# Patient Record
Sex: MALE | Race: WHITE | NOT HISPANIC OR LATINO | Employment: FULL TIME | URBAN - METROPOLITAN AREA
[De-identification: names, ages, dates, MRNs, and addresses within clinical notes are randomized per-mention and may not be internally consistent; named-entity substitution may affect disease eponyms.]

---

## 2018-08-23 ENCOUNTER — OFFICE VISIT CONVERTED (OUTPATIENT)
Dept: INTERNAL MEDICINE | Facility: CLINIC | Age: 58
End: 2018-08-23
Attending: INTERNAL MEDICINE

## 2018-10-08 ENCOUNTER — OFFICE VISIT CONVERTED (OUTPATIENT)
Dept: SURGERY | Facility: CLINIC | Age: 58
End: 2018-10-08
Attending: SURGERY

## 2018-10-08 ENCOUNTER — CONVERSION ENCOUNTER (OUTPATIENT)
Dept: SURGERY | Facility: CLINIC | Age: 58
End: 2018-10-08

## 2018-10-16 ENCOUNTER — CONVERSION ENCOUNTER (OUTPATIENT)
Dept: INTERNAL MEDICINE | Facility: CLINIC | Age: 58
End: 2018-10-16

## 2018-10-16 ENCOUNTER — OFFICE VISIT CONVERTED (OUTPATIENT)
Dept: INTERNAL MEDICINE | Facility: CLINIC | Age: 58
End: 2018-10-16
Attending: INTERNAL MEDICINE

## 2019-01-09 ENCOUNTER — OFFICE VISIT CONVERTED (OUTPATIENT)
Dept: INTERNAL MEDICINE | Facility: CLINIC | Age: 59
End: 2019-01-09
Attending: INTERNAL MEDICINE

## 2019-01-09 ENCOUNTER — HOSPITAL ENCOUNTER (OUTPATIENT)
Dept: OTHER | Facility: HOSPITAL | Age: 59
Discharge: HOME | End: 2019-01-09
Attending: INTERNAL MEDICINE

## 2019-01-09 LAB
ALBUMIN SERPL-MCNC: 4.1 G/DL (ref 3.5–5)
ALBUMIN/GLOB SERPL: 1.4 {RATIO} (ref 1.4–2.6)
ALP SERPL-CCNC: 88 U/L (ref 56–119)
ALT SERPL-CCNC: 39 U/L (ref 10–40)
ANION GAP SERPL CALC-SCNC: 15 MMOL/L (ref 8–19)
AST SERPL-CCNC: 21 U/L (ref 15–50)
BILIRUB SERPL-MCNC: 0.37 MG/DL (ref 0.2–1.3)
BUN SERPL-MCNC: 15 MG/DL (ref 5–25)
BUN/CREAT SERPL: 13 {RATIO} (ref 6–20)
CALCIUM SERPL-MCNC: 9 MG/DL (ref 8.7–10.4)
CHLORIDE SERPL-SCNC: 104 MMOL/L (ref 99–111)
CONV CO2: 24 MMOL/L (ref 22–32)
CONV TOTAL PROTEIN: 7 G/DL (ref 6.3–8.2)
CREAT UR-MCNC: 1.15 MG/DL (ref 0.7–1.2)
GFR SERPLBLD BASED ON 1.73 SQ M-ARVRAT: >60 ML/MIN/{1.73_M2}
GLOBULIN UR ELPH-MCNC: 2.9 G/DL (ref 2–3.5)
GLUCOSE SERPL-MCNC: 104 MG/DL (ref 70–99)
OSMOLALITY SERPL CALC.SUM OF ELEC: 287 MOSM/KG (ref 273–304)
POTASSIUM SERPL-SCNC: 4.8 MMOL/L (ref 3.5–5.3)
SODIUM SERPL-SCNC: 138 MMOL/L (ref 135–147)

## 2019-02-12 ENCOUNTER — OFFICE VISIT CONVERTED (OUTPATIENT)
Dept: INTERNAL MEDICINE | Facility: CLINIC | Age: 59
End: 2019-02-12
Attending: INTERNAL MEDICINE

## 2019-02-12 ENCOUNTER — HOSPITAL ENCOUNTER (OUTPATIENT)
Dept: OTHER | Facility: HOSPITAL | Age: 59
Discharge: HOME | End: 2019-02-12
Attending: INTERNAL MEDICINE

## 2019-02-12 LAB
ANION GAP SERPL CALC-SCNC: 15 MMOL/L (ref 8–19)
BUN SERPL-MCNC: 17 MG/DL (ref 5–25)
BUN/CREAT SERPL: 14 {RATIO} (ref 6–20)
CALCIUM SERPL-MCNC: 8.9 MG/DL (ref 8.7–10.4)
CHLORIDE SERPL-SCNC: 106 MMOL/L (ref 99–111)
CONV CO2: 24 MMOL/L (ref 22–32)
CREAT UR-MCNC: 1.22 MG/DL (ref 0.7–1.2)
GFR SERPLBLD BASED ON 1.73 SQ M-ARVRAT: >60 ML/MIN/{1.73_M2}
GLUCOSE SERPL-MCNC: 160 MG/DL (ref 70–99)
OSMOLALITY SERPL CALC.SUM OF ELEC: 295 MOSM/KG (ref 273–304)
POTASSIUM SERPL-SCNC: 4.7 MMOL/L (ref 3.5–5.3)
SODIUM SERPL-SCNC: 140 MMOL/L (ref 135–147)

## 2019-03-19 ENCOUNTER — HOSPITAL ENCOUNTER (OUTPATIENT)
Dept: OTHER | Facility: HOSPITAL | Age: 59
Discharge: HOME | End: 2019-03-19
Attending: PHYSICIAN ASSISTANT

## 2019-03-19 ENCOUNTER — OFFICE VISIT CONVERTED (OUTPATIENT)
Dept: INTERNAL MEDICINE | Facility: CLINIC | Age: 59
End: 2019-03-19
Attending: PHYSICIAN ASSISTANT

## 2019-04-26 ENCOUNTER — HOSPITAL ENCOUNTER (OUTPATIENT)
Dept: OTHER | Facility: HOSPITAL | Age: 59
Discharge: HOME | End: 2019-04-26
Attending: PHYSICIAN ASSISTANT

## 2019-04-26 ENCOUNTER — OFFICE VISIT CONVERTED (OUTPATIENT)
Dept: INTERNAL MEDICINE | Facility: CLINIC | Age: 59
End: 2019-04-26
Attending: PHYSICIAN ASSISTANT

## 2019-06-18 ENCOUNTER — OFFICE VISIT CONVERTED (OUTPATIENT)
Dept: INTERNAL MEDICINE | Facility: CLINIC | Age: 59
End: 2019-06-18
Attending: INTERNAL MEDICINE

## 2019-09-30 ENCOUNTER — OFFICE VISIT CONVERTED (OUTPATIENT)
Dept: INTERNAL MEDICINE | Facility: CLINIC | Age: 59
End: 2019-09-30
Attending: INTERNAL MEDICINE

## 2019-10-01 ENCOUNTER — HOSPITAL ENCOUNTER (OUTPATIENT)
Dept: CT IMAGING | Facility: HOSPITAL | Age: 59
Discharge: HOME | End: 2019-10-01
Attending: INTERNAL MEDICINE

## 2019-10-01 LAB
CREAT BLD-MCNC: 1.2 MG/DL (ref 0.6–1.4)
GFR SERPLBLD BASED ON 1.73 SQ M-ARVRAT: >60 ML/MIN/{1.73_M2}

## 2019-10-10 ENCOUNTER — HOSPITAL ENCOUNTER (OUTPATIENT)
Dept: CARDIOLOGY | Facility: HOSPITAL | Age: 59
Discharge: HOME | End: 2019-10-10
Attending: INTERNAL MEDICINE

## 2020-03-13 ENCOUNTER — HOSPITAL ENCOUNTER (OUTPATIENT)
Dept: OTHER | Facility: HOSPITAL | Age: 60
Discharge: HOME | End: 2020-03-13
Attending: PHYSICIAN ASSISTANT

## 2020-03-13 ENCOUNTER — OFFICE VISIT CONVERTED (OUTPATIENT)
Dept: INTERNAL MEDICINE | Facility: CLINIC | Age: 60
End: 2020-03-13
Attending: PHYSICIAN ASSISTANT

## 2020-12-10 ENCOUNTER — HOSPITAL ENCOUNTER (OUTPATIENT)
Dept: OTHER | Facility: HOSPITAL | Age: 60
Discharge: HOME | End: 2020-12-10
Attending: INTERNAL MEDICINE

## 2020-12-10 ENCOUNTER — CONVERSION ENCOUNTER (OUTPATIENT)
Dept: INTERNAL MEDICINE | Facility: CLINIC | Age: 60
End: 2020-12-10

## 2020-12-10 ENCOUNTER — OFFICE VISIT CONVERTED (OUTPATIENT)
Dept: INTERNAL MEDICINE | Facility: CLINIC | Age: 60
End: 2020-12-10
Attending: INTERNAL MEDICINE

## 2020-12-11 LAB
ALBUMIN SERPL-MCNC: 4.2 G/DL (ref 3.5–5)
ALBUMIN/GLOB SERPL: 1.4 {RATIO} (ref 1.4–2.6)
ALP SERPL-CCNC: 106 U/L (ref 56–119)
ALT SERPL-CCNC: 41 U/L (ref 10–40)
ANION GAP SERPL CALC-SCNC: 18 MMOL/L (ref 8–19)
AST SERPL-CCNC: 26 U/L (ref 15–50)
BASOPHILS # BLD AUTO: 0.04 10*3/UL (ref 0–0.2)
BASOPHILS NFR BLD AUTO: 0.6 % (ref 0–3)
BILIRUB SERPL-MCNC: 0.57 MG/DL (ref 0.2–1.3)
BUN SERPL-MCNC: 13 MG/DL (ref 5–25)
BUN/CREAT SERPL: 12 {RATIO} (ref 6–20)
CALCIUM SERPL-MCNC: 9.2 MG/DL (ref 8.7–10.4)
CHLORIDE SERPL-SCNC: 104 MMOL/L (ref 99–111)
CHOLEST SERPL-MCNC: 163 MG/DL (ref 107–200)
CHOLEST/HDLC SERPL: 4.9 {RATIO} (ref 3–6)
CONV ABS IMM GRAN: 0.01 10*3/UL (ref 0–0.2)
CONV CO2: 20 MMOL/L (ref 22–32)
CONV IMMATURE GRAN: 0.1 % (ref 0–1.8)
CONV TOTAL PROTEIN: 7.3 G/DL (ref 6.3–8.2)
CREAT UR-MCNC: 1.13 MG/DL (ref 0.7–1.2)
DEPRECATED RDW RBC AUTO: 43.3 FL (ref 35.1–43.9)
EOSINOPHIL # BLD AUTO: 0.4 10*3/UL (ref 0–0.7)
EOSINOPHIL # BLD AUTO: 5.8 % (ref 0–7)
ERYTHROCYTE [DISTWIDTH] IN BLOOD BY AUTOMATED COUNT: 13.2 % (ref 11.6–14.4)
GFR SERPLBLD BASED ON 1.73 SQ M-ARVRAT: >60 ML/MIN/{1.73_M2}
GLOBULIN UR ELPH-MCNC: 3.1 G/DL (ref 2–3.5)
GLUCOSE SERPL-MCNC: 108 MG/DL (ref 70–99)
HCT VFR BLD AUTO: 47.5 % (ref 42–52)
HDLC SERPL-MCNC: 33 MG/DL (ref 40–60)
HGB BLD-MCNC: 15.5 G/DL (ref 14–18)
LDLC SERPL CALC-MCNC: 109 MG/DL (ref 70–100)
LYMPHOCYTES # BLD AUTO: 3.07 10*3/UL (ref 1–5)
LYMPHOCYTES NFR BLD AUTO: 44.5 % (ref 20–45)
MCH RBC QN AUTO: 29.4 PG (ref 27–31)
MCHC RBC AUTO-ENTMCNC: 32.6 G/DL (ref 33–37)
MCV RBC AUTO: 90 FL (ref 80–96)
MONOCYTES # BLD AUTO: 0.54 10*3/UL (ref 0.2–1.2)
MONOCYTES NFR BLD AUTO: 7.8 % (ref 3–10)
NEUTROPHILS # BLD AUTO: 2.84 10*3/UL (ref 2–8)
NEUTROPHILS NFR BLD AUTO: 41.2 % (ref 30–85)
NRBC CBCN: 0 % (ref 0–0.7)
OSMOLALITY SERPL CALC.SUM OF ELEC: 285 MOSM/KG (ref 273–304)
PLATELET # BLD AUTO: 278 10*3/UL (ref 130–400)
PMV BLD AUTO: 10.2 FL (ref 9.4–12.4)
POTASSIUM SERPL-SCNC: 4.6 MMOL/L (ref 3.5–5.3)
PSA SERPL-MCNC: 0.35 NG/ML (ref 0–4)
RBC # BLD AUTO: 5.28 10*6/UL (ref 4.7–6.1)
SODIUM SERPL-SCNC: 137 MMOL/L (ref 135–147)
TRIGL SERPL-MCNC: 105 MG/DL (ref 40–150)
TSH SERPL-ACNC: 1.73 M[IU]/L (ref 0.27–4.2)
VLDLC SERPL-MCNC: 21 MG/DL (ref 5–37)
WBC # BLD AUTO: 6.9 10*3/UL (ref 4.8–10.8)

## 2021-02-12 ENCOUNTER — OFFICE VISIT CONVERTED (OUTPATIENT)
Dept: INTERNAL MEDICINE | Facility: CLINIC | Age: 61
End: 2021-02-12
Attending: INTERNAL MEDICINE

## 2021-02-23 ENCOUNTER — OFFICE VISIT CONVERTED (OUTPATIENT)
Dept: ORTHOPEDIC SURGERY | Facility: CLINIC | Age: 61
End: 2021-02-23
Attending: ORTHOPAEDIC SURGERY

## 2021-02-26 ENCOUNTER — HOSPITAL ENCOUNTER (OUTPATIENT)
Dept: OTHER | Facility: HOSPITAL | Age: 61
Setting detail: RECURRING SERIES
Discharge: HOME | End: 2021-04-29
Attending: INTERNAL MEDICINE

## 2021-03-22 ENCOUNTER — OFFICE VISIT CONVERTED (OUTPATIENT)
Dept: ORTHOPEDIC SURGERY | Facility: CLINIC | Age: 61
End: 2021-03-22
Attending: PHYSICIAN ASSISTANT

## 2021-05-10 NOTE — H&P
History and Physical      Patient Name: Kit Gama   Patient ID: [id number]   Sex: Male   YOB: 1960    Primary Care Provider: Blu Engel MD   Referring Provider: Blu Engel MD    Visit Date: February 23, 2021    Provider: Hemant Singer MD   Location: [facility]   Location Address: [address] [place] [address]  [id number]   Location Phone: [phone]          Chief Complaint  · Right knee pain      History Of Present Illness  Kit Gama is a 60 year old /White male who presents today to [facility].      The patient presents here today for evaluation of his right knee. He states he has been having knee pain for over a year now over the anterior medial knee. He describes it as a stabbing pain. He admits to instability, clicking,  and popping of the knee. He states pain is worse with activity but it also gives him pain at night. His PCP has given him steroid injections with no relief.  He does use Voltaren gel for pain. His PCP is sending him to physical therapy.             Past Medical History  Asthma; Broken Bones; GERD (gastroesophageal reflux disease); Kidney stones; Migraine; Night sweat         Past Surgical History  Appendectomy; EYE SURGERY         Medication List  diclofenac sodium 75 mg oral tablet,delayed release (DR/EC); losartan 100 mg oral tablet; ProAir HFA 90 mcg/actuation inhalation HFA aerosol inhaler; Voltaren 1 % topical gel         Allergy List  NO KNOWN DRUG ALLERGIES       Allergies Reconciled  Social History  Alcohol (Never); Tobacco (Never)         Review of Systems  · Constitutional  o Denies  o : fever, chills, weight loss  · Cardiovascular  o Denies  o : chest pain, shortness of breath  · Gastrointestinal  o Denies  o : liver disease, heartburn, nausea, blood in stools  · Genitourinary  o Denies  o : painful urination, blood in urine  · Integument  o Denies  o : rash, itching  · Neurologic  o Denies  o :  "headache, weakness, loss of consciousness  · Musculoskeletal  o Denies  o : painful, swollen joints  · Psychiatric  o Denies  o : drug/alcohol addiction, anxiety, depression      Vitals  Date Time BP Position Site L\R Cuff Size HR RR TEMP (F) WT  HT  BMI kg/m2 BSA m2 O2 Sat FR L/min FiO2 HC       02/23/2021 02:30 PM         231lbs 5oz 5'  7\" 36.23 2.23             Physical Examination  · Constitutional  o Appearance  o : well developed, well-nourished, no obvious deformities present  · Head and Face  o Head  o :   § Inspection  § : normocephalic  o Face  o :   § Inspection  § : no facial lesions  · Eyes  o Conjunctivae  o : conjunctivae normal  o Sclerae  o : sclerae white  · Ears, Nose, Mouth and Throat  o Ears  o :   § External Ears  § : appearance within normal limits  § Hearing  § : intact  o Nose  o :   § External Nose  § : appearance normal  · Neck  o Inspection/Palpation  o : normal appearance  o Range of Motion  o : full range of motion  · Respiratory  o Respiratory Effort  o : breathing unlabored  o Inspection of Chest  o : normal appearance  o Auscultation of Lungs  o : no audible wheezing or rales  · Cardiovascular  o Heart  o : regular rate  · Gastrointestinal  o Abdominal Examination  o : soft and non-tender  · Skin and Subcutaneous Tissue  o General Inspection  o : intact, no rashes  · Psychiatric  o General  o : Alert and oriented x3  o Judgement and Insight  o : judgment and insight intact  o Mood and Affect  o : mood normal, affect appropriate  · Right Knee  o Inspection  o : Non tender. No skin discoloration, atrophy, or swelling. -5 Extension. Flexion 120. Stable to varus and valgus stress. Stable to anterior and posterior drawer. Negative Eduardo's. Crepitus with ROM. Neurovascularly intact.  · In Office Procedures  o View  o : LAT/SUNRISE/STANDING  o Site  o : right, knee  o Indication  o : right knee pain  o Study  o : X-rays ordered, taken in the office, and reviewed today.  o Xray  o : " Advanced degenerative changes with bone on bone medial compartment changes, mild varus deformity. Small foreign body in the knee.   o Comparative Data  o : No comparative data found          Assessment  · Primary osteoarthritis of right knee     715.16/M17.11  · Right knee pain, unspecified chronicity     719.46/M25.561      Plan  · Orders  o Knee (Right) [facility] Preferred View ([id number]) - 719.46/M25.561 - 02/23/2021  · Medications  o Transition of Care or Provider Policy  · Instructions  o Reviewed the patient's Past Medical, Social, and Family history as well as the ROS at today's visit, no changes.  o Call or return if worsening symptoms.  o The above service was scribed by Grisel Bustillo on my behalf and I attest to the accuracy of the note. jsb  o Discussed the treatment options with the patient. The patient requested to hold off on surgery as long as he can. We will try to get an approval for Visco supplementation or Zilretta injections. Normal knee brace given today.   o Electronically Identified Patient Education Materials Provided Electronically            Electronically Signed by: Grisel Bustillo MA -Author on February 24, 2021 09:13:02 AM  Electronically Co-signed by: Hemant Singer MD -Reviewer on February 24, 2021 08:25:10 PM

## 2021-05-13 ENCOUNTER — OFFICE VISIT CONVERTED (OUTPATIENT)
Dept: INTERNAL MEDICINE | Facility: CLINIC | Age: 61
End: 2021-05-13
Attending: INTERNAL MEDICINE

## 2021-05-13 ENCOUNTER — HOSPITAL ENCOUNTER (OUTPATIENT)
Dept: INTERNAL MEDICINE | Facility: CLINIC | Age: 61
Discharge: HOME | End: 2021-05-13
Attending: INTERNAL MEDICINE

## 2021-05-13 ENCOUNTER — OFFICE VISIT CONVERTED (OUTPATIENT)
Dept: ORTHOPEDIC SURGERY | Facility: CLINIC | Age: 61
End: 2021-05-13
Attending: PHYSICIAN ASSISTANT

## 2021-05-13 LAB
ALBUMIN SERPL-MCNC: 4.1 G/DL (ref 3.5–5)
ALBUMIN/GLOB SERPL: 1.3 {RATIO} (ref 1.4–2.6)
ALP SERPL-CCNC: 96 U/L (ref 56–119)
ALT SERPL-CCNC: 41 U/L (ref 10–40)
ANION GAP SERPL CALC-SCNC: 16 MMOL/L (ref 8–19)
AST SERPL-CCNC: 26 U/L (ref 15–50)
BASOPHILS # BLD AUTO: 0.06 10*3/UL (ref 0–0.2)
BASOPHILS NFR BLD AUTO: 0.8 % (ref 0–3)
BILIRUB SERPL-MCNC: 0.27 MG/DL (ref 0.2–1.3)
BUN SERPL-MCNC: 19 MG/DL (ref 5–25)
BUN/CREAT SERPL: 15 {RATIO} (ref 6–20)
CALCIUM SERPL-MCNC: 9 MG/DL (ref 8.7–10.4)
CHLORIDE SERPL-SCNC: 106 MMOL/L (ref 99–111)
CHOLEST SERPL-MCNC: 160 MG/DL (ref 107–200)
CHOLEST/HDLC SERPL: 5 {RATIO} (ref 3–6)
CONV ABS IMM GRAN: 0.03 10*3/UL (ref 0–0.2)
CONV CO2: 22 MMOL/L (ref 22–32)
CONV IMMATURE GRAN: 0.4 % (ref 0–1.8)
CONV TOTAL PROTEIN: 7.2 G/DL (ref 6.3–8.2)
CREAT UR-MCNC: 1.25 MG/DL (ref 0.7–1.2)
DEPRECATED RDW RBC AUTO: 42.7 FL (ref 35.1–43.9)
EOSINOPHIL # BLD AUTO: 0.4 10*3/UL (ref 0–0.7)
EOSINOPHIL # BLD AUTO: 5.3 % (ref 0–7)
ERYTHROCYTE [DISTWIDTH] IN BLOOD BY AUTOMATED COUNT: 13.1 % (ref 11.6–14.4)
GFR SERPLBLD BASED ON 1.73 SQ M-ARVRAT: >60 ML/MIN/{1.73_M2}
GLOBULIN UR ELPH-MCNC: 3.1 G/DL (ref 2–3.5)
GLUCOSE SERPL-MCNC: 114 MG/DL (ref 70–99)
HCT VFR BLD AUTO: 45.5 % (ref 42–52)
HDLC SERPL-MCNC: 32 MG/DL (ref 40–60)
HGB BLD-MCNC: 14.6 G/DL (ref 14–18)
LDLC SERPL CALC-MCNC: 99 MG/DL (ref 70–100)
LYMPHOCYTES # BLD AUTO: 2.89 10*3/UL (ref 1–5)
LYMPHOCYTES NFR BLD AUTO: 38.6 % (ref 20–45)
MCH RBC QN AUTO: 28.9 PG (ref 27–31)
MCHC RBC AUTO-ENTMCNC: 32.1 G/DL (ref 33–37)
MCV RBC AUTO: 90.1 FL (ref 80–96)
MONOCYTES # BLD AUTO: 0.74 10*3/UL (ref 0.2–1.2)
MONOCYTES NFR BLD AUTO: 9.9 % (ref 3–10)
NEUTROPHILS # BLD AUTO: 3.37 10*3/UL (ref 2–8)
NEUTROPHILS NFR BLD AUTO: 45 % (ref 30–85)
NRBC CBCN: 0 % (ref 0–0.7)
OSMOLALITY SERPL CALC.SUM OF ELEC: 291 MOSM/KG (ref 273–304)
PLATELET # BLD AUTO: 301 10*3/UL (ref 130–400)
PMV BLD AUTO: 10.6 FL (ref 9.4–12.4)
POTASSIUM SERPL-SCNC: 5.2 MMOL/L (ref 3.5–5.3)
RBC # BLD AUTO: 5.05 10*6/UL (ref 4.7–6.1)
SODIUM SERPL-SCNC: 139 MMOL/L (ref 135–147)
TRIGL SERPL-MCNC: 144 MG/DL (ref 40–150)
TSH SERPL-ACNC: 1.72 M[IU]/L (ref 0.27–4.2)
VLDLC SERPL-MCNC: 29 MG/DL (ref 5–37)
WBC # BLD AUTO: 7.49 10*3/UL (ref 4.8–10.8)

## 2021-05-13 NOTE — PROGRESS NOTES
Progress Note      Patient Name: Kit Gama   Patient ID: [id number]   Sex: Male   YOB: 1960    Primary Care Provider: Blu Engel MD   Referring Provider: Blu Engel MD    Visit Date: December 10, 2020    Provider: Blu Engel MD   Location: [facility]   Location Address: [address] [place] [address]  [id number]   Location Phone: [phone]          Chief Complaint  · Right knee pain  · annual exam      History Of Present Illness  Kit Gama is a 59 year old /White male who presents for evaluation and treatment of:      pt reports pain in right knee as well as intermittent edema. pt also reports dec ROM. pt previously had steroid injection that helped tremendously. pt denies erythema.   HTN- pt reports home BP readings typically normal. pt reports inc stressors wt work recently. pt reports working several hours. pt denies HAs, CP.   UTD with vaccines and Cscope  had flu shot  due for PSA       Past Medical History  Disease Name Date Onset Notes   Asthma --  --    Broken Bones --  --    GERD (gastroesophageal reflux disease) --  --    Kidney stones --  --    Migraine --  --    Night sweat --  --          Past Surgical History  Procedure Name Date Notes   Appendectomy --  --    EYE SURGERY --  --          Medication List  Name Date Started Instructions   diclofenac sodium 75 mg oral tablet,delayed release (DR/EC) 12/10/2020 take 1 tablet by oral route 2 times a day for 90 days prn pain   losartan 50 mg oral tablet 12/10/2020 TAKE 1 TABLET(50 MG) BY MOUTH EVERY DAY for 90 days   ProAir HFA 90 mcg/actuation inhalation HFA aerosol inhaler 06/18/2019 inhale 2 puffs by inhalation route every 4 hours prn cough         Allergy List  Allergen Name Date Reaction Notes   NO KNOWN DRUG ALLERGIES --  --  --        Allergies Reconciled  Social History  Finding Status Start/Stop Quantity Notes   Alcohol Never --/-- --  --    Tobacco Never  "--/-- --  --          Immunizations  NameDate Admin Mfg Trade Name Lot Number Route Inj VIS Given VIS Publication   Hepatitis A06/18/2019 SKB HAVRIX-ADULT [id number] IM LD 06/18/2019    Comments: tolerated well   Hepatitis A08/23/2018 SKB HAVRIX-ADULT [id number] IM LD 08/23/2018 07/20/2016   Comments: pt tolerateed well, left office in stable condition   Nudexjtje09/24/2019 NE Not Entered [id number] NE NE 09/30/2019    Comments:          Review of Systems  · Constitutional  o Denies  o : fever, fatigue, weight loss, weight gain  · HENT  o Denies  o : headaches, lightheadedness  · Cardiovascular  o Denies  o : lower extremity edema, chest pressure, palpitations  · Respiratory  o Denies  o : shortness of breath, wheezing, frequent cough, dyspnea on exertion  · Gastrointestinal  o Denies  o : nausea, vomiting, diarrhea, constipation, abdominal pain      Vitals  Date Time BP Position Site L\R Cuff Size HR RR TEMP (F) WT  HT  BMI kg/m2 BSA m2 O2 Sat FR L/min FiO2 HC       09/30/2019 08:31 /76 Sitting    75 - R  97.4 241lbs 2oz 5'  7\" 37.77 2.27 97 %      03/13/2020 08:18 /90 Sitting    94 - R 15 97.8 226lbs 0oz 5'  7\" 35.4 2.2 97 %      12/10/2020 11:02 /80 Sitting    72 - R  97.7 232lbs 6oz 5'  7\" 36.39 2.23 98 %            Physical Examination  · Constitutional  o Appearance  o : no acute distress, well-nourished  · Head and Face  o Head  o :   § Inspection  § : atraumatic, normocephalic  · Eyes  o Eyes  o : extraocular movements intact, no scleral icterus, no conjunctival injection  · Ears, Nose, Mouth and Throat  o Ears  o :   § External Ears  § : normal  o Nose  o :   § Intranasal Exam  § : nares patent  o Oral Cavity  o :   § Oral Mucosa  § : moist mucous membranes  · Respiratory  o Respiratory Effort  o : breathing comfortably, symmetric chest rise  o Auscultation of Lungs  o : clear to asculatation bilaterally, no wheezes, rales, or rhonchii  · Cardiovascular  o Heart  o :   § Auscultation of Heart  § : " regular rate and rhythm, no murmurs, rubs, or gallops  o Peripheral Vascular System  o :   § Extremities  § : no edema  · Neurologic  o Mental Status Examination  o :   § Orientation  § : grossly oriented to person, place and time  o Gait and Station  o :   § Gait Screening  § : normal gait  · Psychiatric  o General  o : normal mood and affect  · IMP Knee Injection  o Procedure info  o : Informed Consent obtained. Patient was informed of possible adverse effects including but not limited to bleeding, damage to nerve, tendon or artery, increased blood sugar, and increased blood pressure. They were instructed to call if they have any conerns or questions. Time out performed. Patient placed with right knee in flexion at 90 degrees. Site marked inferior and lateral to patella. Betadine was swabbed at injection site per typical technique. 25 ga, 1.5 inch needle injected into bursa, aspiration was performed and then 80mg Kenalog and 1mL 1% Lidocaine without Epi was slowly injected into joint space, fluid was free flowing. Needle was removed, betadine wiped off and band-aid placed to injection site.           Assessment  · Screening for depression     V79.0/Z13.89  · Screening for lipid disorders     V77.91/Z13.220  · Screening for prostate cancer     V76.44/Z12.5  · Annual physical exam     V70.0/Z00.00  · Essential hypertension     401.9/I10  cont close monitoring at home. pt understands goal BP <130/80. if consistently higher readings, may double losartan. f/u in 2-3 months for repeat eval.   · Right knee pain     719.46/M25.561  intraarticular steroid injection today. pt tolerated well and left in good condition. will Rx voltaren gel to use and hopefully defer oral NSAID use some.    Problems Reconciled  Plan  · Orders  o ACO-18: Negative screen for clinical depression using a standardized tool () - V79.0/Z13.89 - 12/10/2020  o Male Physical Primary Care Panel (CMP, CBC, TSH, Lipid, PSA) [facility] (54180, 15331, 37789,  78136, 79322, ) - V70.0/Z00.00, V76.44/Z12.5, V77.91/Z13.220 - 12/10/2020  o Joint Injection; major joint or bursa (eg. shoulder, hip, knee, subacromial bursa) (49159) - 719.46/M25.561 - 12/10/2020  o ACO-39: Current medications updated and reviewed (, 1159F) - - 12/10/2020  o ACO-18: Negative screen for clinical depression using a standardized tool () - - 12/10/2020  o ACO-14: Influenza immunization was not administered for reasons documented [facility] () - - 12/10/2020  o ACO-19: Colorectal cancer screening results documented and reviewed (3017F) - - 12/10/2020  o 2 - Kenalog Injection 40mg (-1) - 719.46/M25.561 - 12/10/2020   Injection - Kenalog 40 mg; Dose: 80(2mL); Site: Right Knee; Route: intra-articular; Date: 12/10/2020 13:22:48; Exp: 10/01/2021; Lot: [id number]; Mfg: TEVA PHARM; TradeName: Kenalog; Location[facility]; Administered By: Leeanne Palmer MA; Comment: Pt tolerated well.   Pt given intra-articular injection to right knee. Pt tolerated well, left the office in stable condition. 2mL of kenalog mixed with 1 ml of lidocaine given by MD Giuseppe and drawn MD Giuseppe.   · Medications  o Voltaren 1 % topical gel   SIG: apply 2 grams to the affected area(s) by topical route 4 times per day   DISP: (1) Tube with 3 refills  Prescribed on 12/10/2020     o diclofenac sodium 75 mg oral tablet,delayed release (DR/EC)   SIG: take 1 tablet by oral route 2 times a day for 90 days prn pain   DISP: (180) Tablet with 1 refills  Adjusted on 12/10/2020     o losartan 50 mg oral tablet   SIG: TAKE 1 TABLET(50 MG) BY MOUTH EVERY DAY for 90 days   DISP: (90) Tablet with 3 refills  Refilled on 12/10/2020     o Medications have been Reconciled  o Transition of Care or Provider Policy  · Instructions  o Depression Screen completed and scanned into the EMR under the designated folder within the patient's documents.  o Today's PHQ-9 result is _0__  o Reviewed health maintenance  flowsheet and updated information. Orders were placed and/or patient's response was documented.  o Patient was educated/instructed on their diagnosis, treatment and medications prior to discharge from the clinic today.  · Disposition  o f/u in 1 month            Electronically Signed by: Blu Engel MD -Author on December 10, 2020 01:32:51 PM

## 2021-05-14 VITALS — WEIGHT: 236.12 LBS | BODY MASS INDEX: 37.06 KG/M2 | HEART RATE: 83 BPM | OXYGEN SATURATION: 98 % | HEIGHT: 67 IN

## 2021-05-14 VITALS
WEIGHT: 232.37 LBS | OXYGEN SATURATION: 98 % | HEIGHT: 67 IN | DIASTOLIC BLOOD PRESSURE: 80 MMHG | BODY MASS INDEX: 36.47 KG/M2 | SYSTOLIC BLOOD PRESSURE: 172 MMHG | HEART RATE: 72 BPM | TEMPERATURE: 97.7 F

## 2021-05-14 VITALS
BODY MASS INDEX: 36.26 KG/M2 | HEART RATE: 97 BPM | HEIGHT: 67 IN | SYSTOLIC BLOOD PRESSURE: 138 MMHG | OXYGEN SATURATION: 78 % | DIASTOLIC BLOOD PRESSURE: 80 MMHG | TEMPERATURE: 97.3 F | WEIGHT: 231 LBS

## 2021-05-14 VITALS — BODY MASS INDEX: 36.3 KG/M2 | WEIGHT: 231.31 LBS | HEIGHT: 67 IN

## 2021-05-14 LAB
EST. AVERAGE GLUCOSE BLD GHB EST-MCNC: 126 MG/DL
HBA1C MFR BLD: 6 % (ref 3.5–5.7)

## 2021-05-14 NOTE — PROGRESS NOTES
"   Progress Note      Patient Name: Kit Gama   Patient ID: [id number]   Sex: Male   YOB: 1960    Primary Care Provider: Blu Engel MD   Referring Provider: Blu Engel MD    Visit Date: February 12, 2021    Provider: Blu Engel MD   Location: [facility]   Location Address: [address] [place] [address]  [id number]   Location Phone: [phone]          Chief Complaint  · Right knee pain      History Of Present Illness  Kit Gama is a 60 year old /White male who presents for evaluation and treatment of:      HTN and right knee pain.    HTN: at home, runs 130s over high 70s.  Has been taking two 50 mg of losartan daily in the interim.  Missed one dose of medicine recently but otherwise compliant.  Asking for refills today.  He denies CP or headache.    Arthritis: taking PO diclofenac daily, and frequently mixing with advil.  Has been getting more stiff and having more joint pain during this cold weather season.  Having problems with both hands, right hip and right hip.  Never received voltaren prescription.  Using OTC topical medicine.  Reports that previous joint injection alleviated symptoms for only 1 week in his right knee.  Reports Tylenol makes him drowsy, and prefers not to use it.  Has had some close calls with near falls due to knee giving out.       Allergy List    Allergies Reconciled  Vitals  Date Time BP Position Site L\R Cuff Size HR RR TEMP (F) WT  HT  BMI kg/m2 BSA m2 O2 Sat FR L/min FiO2 HC       09/30/2019 08:31 /76 Sitting    75 - R  97.4 241lbs 2oz 5'  7\" 37.77 2.27 97 %      03/13/2020 08:18 /90 Sitting    94 - R 15 97.8 226lbs 0oz 5'  7\" 35.4 2.2 97 %      12/10/2020 11:02 /80 Sitting    72 - R  97.7 232lbs 6oz 5'  7\" 36.39 2.23 98 %      02/12/2021 04:35 /80 Sitting    97 - R  97.3 231lbs 0oz 5'  7\" 36.18 2.23 78 %  21%          Physical " Examination  · Constitutional  o Appearance  o : no acute distress, well-nourished  · Head and Face  o Head  o :   § Inspection  § : atraumatic, normocephalic  · Eyes  o Eyes  o : extraocular movements intact, no scleral icterus, no conjunctival injection  · Ears, Nose, Mouth and Throat  o Ears  o :   § External Ears  § : normal  o Nose  o :   § Intranasal Exam  § : nares patent  o Oral Cavity  o :   § Oral Mucosa  § : moist mucous membranes  · Respiratory  o Respiratory Effort  o : breathing comfortably, symmetric chest rise  o Auscultation of Lungs  o : clear to asculatation bilaterally, no wheezes, rales, or rhonchii  · Cardiovascular  o Heart  o :   § Auscultation of Heart  § : regular rate and rhythm, no murmurs, rubs, or gallops  o Peripheral Vascular System  o :   § Extremities  § : no edema  · Neurologic  o Mental Status Examination  o :   § Orientation  § : grossly oriented to person, place and time  o Gait and Station  o :   § Gait Screening  § : normal gait  · Psychiatric  o General  o : normal mood and affect     Musculoskeletal:    mild effusion noted right knee lateral to patella.  No warm, no erythema.  No effusion noted left knee.    Neuro: 1+ patellar reflexes bilaterally           Assessment  · Right knee pain     719.46/M25.561  -will trial voltaren  -counseled to avoid excessive use of as well as mixing of PO NSAIDs  -will refer for PT and Ortho  -will F/U in 3-4 months  · Hypertension     401.9/I10  -BP improved today and near goal of 130/80 on increased losartan  -BP readings at home 130s/high 70s  -will continue current regimen (have switched rx from 50 to 100 mg tablets as was taking two tabs)  -bs 108 noted on recent labs; will obtain A1c at next clinic appointment    Problems Reconciled  Plan  · Orders  o ACO-39: Current medications updated and reviewed (1159F, ) - - 02/12/2021  o PHYSICAL THERAPY CONSULTATION (PHYTH) - 719.46/M25.561 - 02/12/2021  o ORTHOPEDIC CONSULTATION (ORTHO) -  719.46/M25.561 - 2021  · Medications  o losartan 100 mg oral tablet   SI tablet by mouth daily   DISP: (90) Tablet with 3 refills  Adjusted on 2021     o diclofenac sodium 75 mg oral tablet,delayed release (DR/EC)   SIG: take 1 tablet by oral route 2 times a day for 90 days prn pain   DISP: (180) Tablet with 1 refills  Refilled on 2021     o Voltaren 1 % topical gel   SIG: apply 2 grams to the affected area(s) by topical route 4 times per day   DISP: (1) Tube with 3 refills  Refilled on 2021     o Medications have been Reconciled  o Transition of Care or Provider Policy  · Instructions  o Patient was educated/instructed on their diagnosis, treatment and medications prior to discharge from the clinic today.  · Disposition  o Care Transition  o KELSEY Sent  o Return Visit Request in/on 3 months +/- 2 days (77324).            Electronically Signed by: Blu Engel MD -Author on February 15, 2021 10:10:26 AM

## 2021-05-14 NOTE — PROGRESS NOTES
Progress Note      Patient Name: Kit Gama   Patient ID: [id number]   Sex: Male   YOB: 1960    Primary Care Provider: Blu Engel MD   Referring Provider: Blu Engel MD    Visit Date: March 22, 2021    Provider: Lamont Logan PA-C   Location: [facility]   Location Address: [address] [place] [address]  [address]   Location Phone: [phone]          Chief Complaint  · Right knee pain      History Of Present Illness  Kit Gama is a 60 year old /White male who presents today to [facility]. Patient presents for evaluation of right knee pain, right knee osteoarthritis and to receive previous approved right knee Zilretta injection. Patient was seen by Dr. Singer at last visit, he has been taking diclofenac prescribed by his primary care physician, he has tried Voltaren gel with mild relief. Patient uses a knee brace. Patient points anterior medial knee as his area of worst pain. Patient has been attending physical therapy 2 times per week for the last 3 weeks, he states this is not helping his pain and increasing his strength.       Past Medical History  Arthritis; Asthma; Broken Bones; GERD (gastroesophageal reflux disease); Hypertension; Kidney stones; Migraine; Night sweat         Past Surgical History  Appendectomy; Back; EYE SURGERY         Medication List  diclofenac sodium 75 mg oral tablet,delayed release (DR/EC); losartan 100 mg oral tablet; ProAir HFA 90 mcg/actuation inhalation HFA aerosol inhaler; Voltaren 1 % topical gel         Allergy List  NO KNOWN DRUG ALLERGIES; NO KNOWN DRUG ALLERGIES       Allergies Reconciled  Family Medical History  Stroke; Cancer, Unspecified; Diabetes, unspecified type; Family history of Arthritis         Social History  Alcohol (Never); Alcohol Use (Never); lives with spouse; .; Recreational Drug Use (Never); Tobacco (Never); Working         Immunizations  Name Date  "Admin   Hepatitis A 06/18/2019   Hepatitis A 08/23/2018   Influenza 09/24/2019   Influenza 06/18/2019         Review of Systems  · Constitutional  o Denies  o : fever, chills, weight loss  · Cardiovascular  o Denies  o : chest pain, shortness of breath  · Gastrointestinal  o Denies  o : liver disease, heartburn, nausea, blood in stools  · Genitourinary  o Denies  o : painful urination, blood in urine  · Integument  o Denies  o : rash, itching  · Neurologic  o Denies  o : headache, weakness, loss of consciousness  · Musculoskeletal  o Denies  o : painful, swollen joints  · Psychiatric  o Denies  o : drug/alcohol addiction, anxiety, depression      Vitals  Date Time BP Position Site L\R Cuff Size HR RR TEMP (F) WT  HT  BMI kg/m2 BSA m2 O2 Sat FR L/min FiO2 HC       03/22/2021 08:43 AM      83 - R   236lbs 2oz 5'  7\" 36.98 2.25 98 %            Physical Examination  · Constitutional  o Appearance  o : well developed, well-nourished, no obvious deformities present  · Head and Face  o Head  o :   § Inspection  § : normocephalic  o Face  o :   § Inspection  § : no facial lesions  · Eyes  o Conjunctivae  o : conjunctivae normal  o Sclerae  o : sclerae white  · Ears, Nose, Mouth and Throat  o Ears  o :   § External Ears  § : appearance within normal limits  § Hearing  § : intact  o Nose  o :   § External Nose  § : appearance normal  · Neck  o Inspection/Palpation  o : normal appearance  o Range of Motion  o : full range of motion  · Respiratory  o Respiratory Effort  o : breathing unlabored  o Inspection of Chest  o : normal appearance  o Auscultation of Lungs  o : no audible wheezing or rales  · Cardiovascular  o Heart  o : regular rate  · Gastrointestinal  o Abdominal Examination  o : soft and non-tender  · Skin and Subcutaneous Tissue  o General Inspection  o : intact, no rashes  · Psychiatric  o General  o : Alert and oriented x3  o Judgement and Insight  o : judgment and insight intact  o Mood and Affect  o : mood " normal, affect appropriate  · Right Knee  o Inspection  o : Skin is intact, no erythema, no ecchymosis, no swelling, nontender to palpation. Tension -5, flexion 120, stable to varus/valgus stress, stable anterior/posterior drawer, negative Eduardo's, crepitus with range of motion, neurovascularly intact.  · Injection Note/Aspiration Note  o Site  o : right knee  o Procedure  o : Procedure: After educating the patient, patient gave consent for procedure. After using Chloraprep, the joint space was injected. The patient tolerated the procedure well.  o Medication  o : 32 mg Zilretta with 5cc of 1% Lidocaine  · Imaging  o Imaging  o : View : LAT/SUNRISE/STANDING Site : right, knee Indication : right knee pain Study : X-rays ordered, taken in the office, and reviewed today. Xray : Advanced degenerative changes with bone on bone medial compartment changes, mild varus deformity. Small foreign body in the knee. Comparative Data : No comparative data found           Assessment  · Primary osteoarthritis of right knee     715.16/M17.11  · Right knee pain, unspecified chronicity     719.46/M25.561      Plan  · Orders  o Zilretta- 1 mg. () () - 715.16/M17.11 - 03/22/2021   Lot [id number] Exp 08 2021 Flexion Administered by FLACO FRANKLIN PA-C  o Knee Intra-articular Injection without US Guidance [facility] ([id number]) - 715.16/M17.11 - 03/22/2021   Lot [id number] Exp 03 2022 Hospira Administered by FLACO FRANKLIN PA-C  · Medications  o Medications have been Reconciled  o Transition of Care or Provider Policy  · Instructions  o Reviewed the patient's Past Medical, Social, and Family history as well as the ROS at today's visit, no changes.  o Call or return if worsening symptoms.  o Follow up in 6-8 weeks.  o Discussed diagnosis and treatment plan with the patient, we discussed the patient can receive right knee Zilretta injection today he elected to have this and tolerated it well. Continue physical therapy, continue diclofenac,  follow-up in 6 to 8 weeks for reevaluation            Electronically Signed by: Lamont Logan PA-C -Author on March 22, 2021 11:48:09 AM  Electronically Co-signed by: Hemant Singer MD -Reviewer on March 22, 2021 04:25:40 PM

## 2021-05-15 VITALS
HEIGHT: 67 IN | TEMPERATURE: 97.4 F | DIASTOLIC BLOOD PRESSURE: 76 MMHG | SYSTOLIC BLOOD PRESSURE: 148 MMHG | WEIGHT: 241.12 LBS | OXYGEN SATURATION: 97 % | BODY MASS INDEX: 37.84 KG/M2 | HEART RATE: 75 BPM

## 2021-05-15 VITALS
OXYGEN SATURATION: 97 % | HEART RATE: 94 BPM | TEMPERATURE: 97.8 F | SYSTOLIC BLOOD PRESSURE: 134 MMHG | BODY MASS INDEX: 35.47 KG/M2 | DIASTOLIC BLOOD PRESSURE: 90 MMHG | RESPIRATION RATE: 15 BRPM | HEIGHT: 67 IN | WEIGHT: 226 LBS

## 2021-05-15 VITALS
BODY MASS INDEX: 36.73 KG/M2 | HEIGHT: 67 IN | DIASTOLIC BLOOD PRESSURE: 82 MMHG | WEIGHT: 234 LBS | OXYGEN SATURATION: 97 % | TEMPERATURE: 98.7 F | HEART RATE: 88 BPM | SYSTOLIC BLOOD PRESSURE: 134 MMHG

## 2021-05-15 VITALS
OXYGEN SATURATION: 97 % | HEART RATE: 79 BPM | DIASTOLIC BLOOD PRESSURE: 78 MMHG | TEMPERATURE: 97.9 F | SYSTOLIC BLOOD PRESSURE: 134 MMHG | BODY MASS INDEX: 36.79 KG/M2 | HEIGHT: 67 IN | WEIGHT: 234.37 LBS

## 2021-05-15 VITALS
WEIGHT: 241.25 LBS | HEIGHT: 67 IN | BODY MASS INDEX: 37.87 KG/M2 | OXYGEN SATURATION: 98 % | TEMPERATURE: 97.8 F | SYSTOLIC BLOOD PRESSURE: 138 MMHG | DIASTOLIC BLOOD PRESSURE: 84 MMHG | HEART RATE: 76 BPM

## 2021-05-16 VITALS
HEART RATE: 77 BPM | BODY MASS INDEX: 35.94 KG/M2 | RESPIRATION RATE: 22 BRPM | DIASTOLIC BLOOD PRESSURE: 80 MMHG | HEIGHT: 67 IN | SYSTOLIC BLOOD PRESSURE: 146 MMHG | WEIGHT: 229 LBS | TEMPERATURE: 97.3 F | OXYGEN SATURATION: 98 %

## 2021-05-16 VITALS — RESPIRATION RATE: 14 BRPM | BODY MASS INDEX: 35.98 KG/M2 | WEIGHT: 229.25 LBS | HEIGHT: 67 IN

## 2021-05-16 VITALS
OXYGEN SATURATION: 98 % | WEIGHT: 235 LBS | SYSTOLIC BLOOD PRESSURE: 148 MMHG | BODY MASS INDEX: 36.88 KG/M2 | TEMPERATURE: 97.8 F | DIASTOLIC BLOOD PRESSURE: 86 MMHG | HEIGHT: 67 IN | HEART RATE: 66 BPM

## 2021-05-16 VITALS
HEIGHT: 67 IN | RESPIRATION RATE: 12 BRPM | DIASTOLIC BLOOD PRESSURE: 66 MMHG | SYSTOLIC BLOOD PRESSURE: 132 MMHG | OXYGEN SATURATION: 97 % | BODY MASS INDEX: 35.47 KG/M2 | TEMPERATURE: 97 F | HEART RATE: 67 BPM | WEIGHT: 226 LBS

## 2021-05-16 VITALS
OXYGEN SATURATION: 98 % | WEIGHT: 239.5 LBS | HEART RATE: 62 BPM | TEMPERATURE: 97.6 F | SYSTOLIC BLOOD PRESSURE: 122 MMHG | DIASTOLIC BLOOD PRESSURE: 80 MMHG | HEIGHT: 67 IN | BODY MASS INDEX: 37.59 KG/M2

## 2021-06-05 NOTE — PROGRESS NOTES
Progress Note      Patient Name: Kit Gama   Patient ID: [id number]   Sex: Male   YOB: 1960    Primary Care Provider: Blu Engel MD   Referring Provider: Blu Engel MD    Visit Date: May 13, 2021    Provider: Lamont Logan PA-C   Location: [facility]   Location Address: [address] [place] [address]  [id number]   Location Phone: [phone]          Chief Complaint  · Right knee pain      History Of Present Illness  Kit Gama is a 60 year old /White male who presents today to [facility]. Patient presents for follow-up evaluation right knee pain, right knee osteoarthritis, he received Zilretta injection back on 3/22/2021 he states the injection helped his knee, he states is better than the regular steroid shot she has had in the past, he is a knee brace still, patient states his worst pain is going up and down stairs, going from sitting to standing or in and out of cars. Patient stopped physical therapy but he has been doing home exercises for the last week. No new complaints today.       Past Medical History  Arthritis; Asthma; Broken Bones; GERD (gastroesophageal reflux disease); Hypertension; Kidney Stones; Migraine; Night sweat         Past Surgical History  Appendectomy; Back; EYE SURGERY         Medication List  diclofenac sodium 75 mg oral tablet,delayed release (DR/EC); losartan 100 mg oral tablet; ProAir HFA 90 mcg/actuation inhalation HFA aerosol inhaler; Voltaren 1 % topical gel         Allergy List  NO KNOWN DRUG ALLERGIES       Allergies Reconciled  Family Medical History  Stroke; Cancer, Unspecified; Diabetes, unspecified type; Family history of Arthritis         Social History  Alcohol (Never); Alcohol Use (Never); lives with spouse; .; Recreational Drug Use (Never); Tobacco (Never); Working         Immunizations  Name Date Admin   Hepatitis A 06/18/2019   Hepatitis A 08/23/2018   Influenza 09/24/2019   Influenza  "06/18/2019         Review of Systems  · Constitutional  o Denies  o : fever, chills, weight loss  · Cardiovascular  o Denies  o : chest pain, shortness of breath  · Gastrointestinal  o Denies  o : liver disease, heartburn, nausea, blood in stools  · Genitourinary  o Denies  o : painful urination, blood in urine  · Integument  o Denies  o : rash, itching  · Neurologic  o Denies  o : headache, weakness, loss of consciousness  · Musculoskeletal  o Denies  o : painful, swollen joints  · Psychiatric  o Denies  o : drug/alcohol addiction, anxiety, depression      Vitals  Date Time BP Position Site L\R Cuff Size HR RR TEMP (F) WT  HT  BMI kg/m2 BSA m2 O2 Sat FR L/min FiO2 HC       05/13/2021 08:12 /94 Sitting    72 - R   231lbs 0oz 5'  7\" 36.18 2.23 97 %  21%    05/13/2021 01:28 PM         231lbs 1oz 5'  7\" 36.19 2.23             Physical Examination  · Constitutional  o Appearance  o : well developed, well-nourished, no obvious deformities present  · Head and Face  o Head  o :   § Inspection  § : normocephalic  o Face  o :   § Inspection  § : no facial lesions  · Eyes  o Conjunctivae  o : conjunctivae normal  o Sclerae  o : sclerae white  · Ears, Nose, Mouth and Throat  o Ears  o :   § External Ears  § : appearance within normal limits  § Hearing  § : intact  o Nose  o :   § External Nose  § : appearance normal  · Neck  o Inspection/Palpation  o : normal appearance  o Range of Motion  o : full range of motion  · Respiratory  o Respiratory Effort  o : breathing unlabored  o Inspection of Chest  o : normal appearance  o Auscultation of Lungs  o : no audible wheezing or rales  · Cardiovascular  o Heart  o : regular rate  · Gastrointestinal  o Abdominal Examination  o : soft and non-tender  · Skin and Subcutaneous Tissue  o General Inspection  o : intact, no rashes  · Psychiatric  o General  o : Alert and oriented x3  o Judgement and Insight  o : judgment and insight intact  o Mood and Affect  o : mood normal, affect " appropriate  · Right Knee  o Inspection  o : Skin is intact, no erythema, no ecchymosis, no swelling, nontender to palpation. Tension -5, flexion 120, stable to varus/valgus stress, stable anterior/posterior drawer, negative Eduardo's, crepitus with range of motion, neurovascularly intact.  · Imaging  o Imaging  o : View : LAT/SUNRISE/STANDING Site : right, knee Indication : right knee pain Study : X-rays ordered, taken in the office, and reviewed today. Xray : Advanced degenerative changes with bone on bone medial compartment changes, mild varus deformity. Small foreign body in the knee. Comparative Data : No comparative data found           Assessment  · Primary osteoarthritis of right knee     715.16/M17.11  · Right knee pain, unspecified chronicity     719.46/M25.561      Plan  · Medications  o Medications have been Reconciled  o Transition of Care or Provider Policy  · Instructions  o Reviewed the patient's Past Medical, Social, and Family history as well as the ROS at today's visit, no changes.  o Call or return if worsening symptoms.  o Follow up in 6-8 weeks.  o Discussed diagnosis and treatment option with the patient, he was advised to continue activity as tolerated, he will follow-up in 6 to 8 weeks he would like to discuss right total knee arthroplasty at that time.            Electronically Signed by: Lamont Logan PA-C -Author on May 13, 2021 05:04:24 PM

## 2021-06-05 NOTE — PROGRESS NOTES
Progress Note      Patient Name: Kit Gama   Patient ID: [id number]   Sex: Male   YOB: 1960    Primary Care Provider: Blu Engel MD   Referring Provider: Blu Engel MD    Visit Date: May 13, 2021    Provider: Blu Engel MD   Location: [facility]   Location Address: [address] [place] [address]; [address]  [place]  [address]   Location Phone: [phone]          Chief Complaint  · Hypertension   · annual exam      History Of Present Illness  Kit Gama is a 60 year old /White male who presents for evaluation and treatment of:      HTN- pt denies HAs, dizziness, CP. pt reports home SBP readings 130s. pt has been unable to take losartan over the last couple of days. pt reports inc stressors because of job recently.   asthma- pt has only used albuterol once in last 3 months.   OA- pt reports help with knee injections, but cont to have intermittent pain. pt is f/u with orthopedics who recommend reconstructive surgery at some point. pt reports some help with voltaren.       Past Medical History  Disease Name Date Onset Notes   Arthritis --  --    Asthma --  --    Broken Bones --  --    GERD (gastroesophageal reflux disease) --  --    Hypertension --  --    Kidney Stones --  --    Migraine --  --    Night sweat --  --          Past Surgical History  Procedure Name Date Notes   Appendectomy --  --    Back --  --    EYE SURGERY --  --          Medication List  Name Date Started Instructions   diclofenac sodium 75 mg oral tablet,delayed release (/EC) 02/12/2021 take 1 tablet by oral route 2 times a day for 90 days prn pain   losartan 100 mg oral tablet 02/12/2021 1 tablet by mouth daily   ProAir HFA 90 mcg/actuation inhalation HFA aerosol inhaler 06/18/2019 inhale 2 puffs by inhalation route every 4 hours prn cough   Voltaren 1 % topical gel 02/12/2021 apply 2 grams to the affected area(s) by topical route 4 times per day  "        Allergy List  Allergen Name Date Reaction Notes   NO KNOWN DRUG ALLERGIES --  --  --        Allergies Reconciled  Family Medical History  Disease Name Relative/Age Notes   Stroke Brother/  Sister/   Sister; Brother   Cancer, Unspecified Brother/  Father/   Father; Brother   Diabetes, unspecified type Brother/  Father/  Sister/   Father; Sister; Brother   Family history of Arthritis Brother/  Father/  Mother/   Mother; Father; Brother         Social History  Finding Status Start/Stop Quantity Notes   Alcohol Never --/-- --  --    Alcohol Use Never --/-- --  does not drink   lives with spouse --  --/-- --  --    . --  --/-- --  --    Recreational Drug Use Never --/-- --  no   Tobacco Never --/-- --  never smoker   Working --  --/-- --  --          Immunizations  NameDate Admin Mfg Trade Name Lot Number Route Inj VIS Given VIS Publication   Hepatitis A06/18/2019 SKB HAVRIX-ADULT [id number]  LD 06/18/2019    Comments: tolerated well   Hepatitis A08/23/2018 SKB HAVRIX-ADULT [id number]  LD 08/23/2018 07/20/2016   Comments: pt tolerateed well, left office in stable condition   Qxbjaeykl26/24/2019 NE Not Entered [id number] NE NE 09/30/2019    Comments:          Review of Systems  · Constitutional  o Denies  o : fever, fatigue, weight loss, weight gain  · Cardiovascular  o Denies  o : lower extremity edema, chest pressure, palpitations  · Respiratory  o Denies  o : shortness of breath, wheezing, cough, dyspnea on exertion  · Gastrointestinal  o Denies  o : nausea, vomiting, diarrhea, constipation, abdominal pain      Vitals  Date Time BP Position Site L\R Cuff Size HR RR TEMP (F) WT  HT  BMI kg/m2 BSA m2 O2 Sat FR L/min FiO2 HC       12/10/2020 11:02 /80 Sitting    72 - R  97.7 232lbs 6oz 5'  7\" 36.39 2.23 98 %      02/12/2021 04:35 /80 Sitting    97 - R  97.3 231lbs 0oz 5'  7\" 36.18 2.23 78 %  21%    02/23/2021 02:30 PM         231lbs 5oz 5'  7\" 36.23 2.23       03/22/2021 08:43 AM      83 - R   236lbs 2oz " "5'  7\" 36.98 2.25 98 %      05/13/2021 08:12 /94 Sitting    72 - R   231lbs 0oz 5'  7\" 36.18 2.23 97 %  21%          Physical Examination  · Constitutional  o Appearance  o : no acute distress, well-nourished  · Head and Face  o Head  o :   § Inspection  § : atraumatic, normocephalic  · Eyes  o Eyes  o : extraocular movements intact, no scleral icterus, no conjunctival injection  · Ears, Nose, Mouth and Throat  o Ears  o :   § External Ears  § : normal  o Nose  o :   § Intranasal Exam  § : nares patent  o Oral Cavity  o :   § Oral Mucosa  § : moist mucous membranes  · Respiratory  o Respiratory Effort  o : breathing comfortably, symmetric chest rise  o Auscultation of Lungs  o : clear to asculatation bilaterally, no wheezes, rales, or rhonchii  · Cardiovascular  o Heart  o :   § Auscultation of Heart  § : regular rate and rhythm, no murmurs, rubs, or gallops  o Peripheral Vascular System  o :   § Extremities  § : no edema  · Neurologic  o Mental Status Examination  o :   § Orientation  § : grossly oriented to person, place and time  o Gait and Station  o :   § Gait Screening  § : normal gait  · Psychiatric  o General  o : normal mood and affect          Assessment  · Annual physical exam     V70.0/Z00.00  · Essential hypertension     401.9/I10  encouraged med compliance. cont home monitoring. f/u in [facility] for repeat eval.   · Impaired fasting glucose     790.21/R73.01  check HgbA1c  · Osteoarthritis     715.90/M19.90  cont f/u with ortho for further management .    Problems Reconciled  Plan  · Orders  o Hgb A1c [facility] ([id number]) - 790.21/R73.01 - 05/13/2021  o Physical, Primary Care Panel (CBC, CMP, Lipid, TSH) [facility] ([id number], [id number], [address]5850, [id number]) - V70.0/Z00.00, 790.21/R73.01 - 05/13/2021  o ACO-39: Current medications updated and reviewed (, 1159F) - - 05/13/2021  · Medications  o Medications have been Reconciled  o Transition of Care or Provider Policy  · Instructions  o Reviewed health maintenance flowsheet " and updated information. Orders were placed and/or patient's response was documented.  o Patient was educated/instructed on their diagnosis, treatment and medications prior to discharge from the clinic today.  · Disposition  o f/u in 3 months  o labs done in clinic            Electronically Signed by: Blu Engel MD -Author on May 13, 2021 09:33:02 AM

## 2021-07-15 VITALS
DIASTOLIC BLOOD PRESSURE: 94 MMHG | BODY MASS INDEX: 36.26 KG/M2 | WEIGHT: 231 LBS | OXYGEN SATURATION: 97 % | SYSTOLIC BLOOD PRESSURE: 166 MMHG | HEIGHT: 67 IN | HEART RATE: 72 BPM

## 2021-07-15 VITALS — WEIGHT: 231.06 LBS | HEIGHT: 67 IN | BODY MASS INDEX: 36.27 KG/M2

## 2021-10-21 ENCOUNTER — OFFICE VISIT (OUTPATIENT)
Dept: FAMILY MEDICINE CLINIC | Facility: CLINIC | Age: 61
End: 2021-10-21

## 2021-10-21 ENCOUNTER — HOSPITAL ENCOUNTER (OUTPATIENT)
Dept: GENERAL RADIOLOGY | Facility: HOSPITAL | Age: 61
Discharge: HOME | End: 2021-10-21
Admitting: NURSE PRACTITIONER

## 2021-10-21 ENCOUNTER — TELEPHONE (OUTPATIENT)
Dept: FAMILY MEDICINE CLINIC | Facility: CLINIC | Age: 61
End: 2021-10-21

## 2021-10-21 VITALS
OXYGEN SATURATION: 98 % | WEIGHT: 231 LBS | TEMPERATURE: 97.1 F | DIASTOLIC BLOOD PRESSURE: 88 MMHG | HEART RATE: 62 BPM | BODY MASS INDEX: 36.26 KG/M2 | HEIGHT: 67 IN | SYSTOLIC BLOOD PRESSURE: 148 MMHG

## 2021-10-21 DIAGNOSIS — M25.511 ACUTE PAIN OF RIGHT SHOULDER: ICD-10-CM

## 2021-10-21 DIAGNOSIS — I10 PRIMARY HYPERTENSION: ICD-10-CM

## 2021-10-21 DIAGNOSIS — M25.511 ACUTE PAIN OF RIGHT SHOULDER: Primary | ICD-10-CM

## 2021-10-21 DIAGNOSIS — M25.511 ARTHRALGIA OF RIGHT ACROMIOCLAVICULAR JOINT: ICD-10-CM

## 2021-10-21 DIAGNOSIS — M25.512 ARTHRALGIA OF LEFT ACROMIOCLAVICULAR JOINT: Primary | ICD-10-CM

## 2021-10-21 PROBLEM — N20.0 KIDNEY STONES: Status: ACTIVE | Noted: 2021-10-21

## 2021-10-21 PROBLEM — G43.909 MIGRAINE: Status: ACTIVE | Noted: 2021-10-21

## 2021-10-21 PROBLEM — R61 NIGHT SWEAT: Status: ACTIVE | Noted: 2021-10-21

## 2021-10-21 PROBLEM — J45.909 ASTHMA: Status: ACTIVE | Noted: 2021-10-21

## 2021-10-21 PROBLEM — T14.8XXA BROKEN BONES: Status: ACTIVE | Noted: 2021-10-21

## 2021-10-21 PROBLEM — M19.90 ARTHRITIS: Status: ACTIVE | Noted: 2021-10-21

## 2021-10-21 PROBLEM — K21.9 GERD (GASTROESOPHAGEAL REFLUX DISEASE): Status: ACTIVE | Noted: 2021-10-21

## 2021-10-21 PROCEDURE — 96372 THER/PROPH/DIAG INJ SC/IM: CPT | Performed by: NURSE PRACTITIONER

## 2021-10-21 PROCEDURE — 99213 OFFICE O/P EST LOW 20 MIN: CPT | Performed by: NURSE PRACTITIONER

## 2021-10-21 PROCEDURE — 73030 X-RAY EXAM OF SHOULDER: CPT

## 2021-10-21 RX ORDER — IBUPROFEN 200 MG
TABLET ORAL
COMMUNITY
End: 2021-10-21

## 2021-10-21 RX ORDER — DICLOFENAC SODIUM 75 MG/1
75 TABLET, DELAYED RELEASE ORAL 2 TIMES DAILY
Qty: 60 TABLET | Refills: 2 | Status: SHIPPED | OUTPATIENT
Start: 2021-10-21 | End: 2022-07-14 | Stop reason: SDUPTHER

## 2021-10-21 RX ORDER — DICLOFENAC SODIUM 75 MG/1
75 TABLET, DELAYED RELEASE ORAL 2 TIMES DAILY
COMMUNITY
End: 2021-10-21 | Stop reason: SDUPTHER

## 2021-10-21 RX ORDER — LOSARTAN POTASSIUM 100 MG/1
100 TABLET ORAL DAILY
COMMUNITY
End: 2022-04-07

## 2021-10-21 RX ORDER — HYDROCHLOROTHIAZIDE 25 MG/1
25 TABLET ORAL DAILY
Qty: 30 TABLET | Refills: 2 | Status: SHIPPED | OUTPATIENT
Start: 2021-10-21 | End: 2022-08-15 | Stop reason: SDUPTHER

## 2021-10-21 RX ORDER — METHYLPREDNISOLONE ACETATE 80 MG/ML
40 INJECTION, SUSPENSION INTRA-ARTICULAR; INTRALESIONAL; INTRAMUSCULAR; SOFT TISSUE ONCE
Status: COMPLETED | OUTPATIENT
Start: 2021-10-21 | End: 2021-10-21

## 2021-10-21 RX ADMIN — METHYLPREDNISOLONE ACETATE 40 MG: 80 INJECTION, SUSPENSION INTRA-ARTICULAR; INTRALESIONAL; INTRAMUSCULAR; SOFT TISSUE at 09:41

## 2021-10-21 NOTE — PROGRESS NOTES
"     Follow Up Office Visit      Patient Name: Kit Gama  : 1960   MRN: [id number]     Chief Complaint:    Chief Complaint   Patient presents with   • Shoulder Pain     right shoulder pain for 2+month       History of Present Illness: Kit Gama is a 60 y.o. male who is here today for   C/O-right side shoulder pain for a few months  States he was on the ground pulled hisself out, sharp in right shoulder, felt like it popped out of place and right back in  Did not seek treatment after, no imaging  Pt reports pain constant at this time, worse when holding heavy items   Taking ibuprofen ran out of diclofenac   Subjective      Review of Systems:   Review of Systems   Constitutional: Negative for fever.   Respiratory: Negative for shortness of breath.    Cardiovascular: Negative for chest pain.   Musculoskeletal: Positive for arthralgias.   Neurological: Negative for numbness.        Past Medical History: No past medical history on file.    Past Surgical History: No past surgical history on file.    Family History: No family history on file.    Social History:   Social History     Socioeconomic History   • Marital status:        Medications:     Current Outpatient Medications:   •  diclofenac (VOLTAREN) 75 MG EC tablet, Take 1 tablet by mouth 2 (Two) Times a Day., Disp: 60 tablet, Rfl: 2  •  losartan (COZAAR) 100 MG tablet, Take 100 mg by mouth Daily., Disp: , Rfl:   •  hydroCHLOROthiazide (HYDRODIURIL) 25 MG tablet, Take 1 tablet by mouth Daily., Disp: 30 tablet, Rfl: 2  No current facility-administered medications for this visit.    Allergies:   Not on File      Objective     Physical Exam:  Vital Signs:   Vitals:    10/21/21 0818 10/21/21 0851   BP: 150/84 148/88   Pulse: 62    Temp: 97.1 °F (36.2 °C)    SpO2: 98%    Weight: 105 kg (231 lb)    Height: 170.2 cm (67\")      Body mass index is 36.18 kg/m².     Physical Exam  Neck:      Vascular: No carotid bruit.   Cardiovascular:      Rate " and Rhythm: Normal rate and regular rhythm.      Heart sounds: Normal heart sounds. No murmur heard.      Pulmonary:      Effort: Pulmonary effort is normal.      Breath sounds: Normal breath sounds.   Musculoskeletal:      Right lower leg: No edema.      Left lower leg: No edema.      Comments: Decreased ROM right shoulder unable to reach center of back   Skin:     General: Skin is warm and dry.      Capillary Refill: Capillary refill takes less than 2 seconds.   Neurological:      Mental Status: He is alert.         Procedure:   Right Shoulder Joint Injection    Procedure Information:   Informed consent obtained. Patient was informed of possible adverse effects including but not limited to bleeding, damage to nerve, tendon or artery, increased blood sugar and increased blood pressure. Time out was performed. Patient was placed with shoulder passively hanging from side of body at 0 degrees. Lateral edge of scapular spine was palpated and site was marked just inferior to this, to proceed with injection per typical posterior approach. Betadine was swabbed at injection site per typical technique. 27 gauge, 1.5 inch needle injected into bursa, directed slightly medially, aspiration was performed and then depomedrol 40 mg and 2mL 1% Lidocaine without Epinephrine was slowly injected into joint space, fluid was free flowing. Needle was removed, betadine wiped off and band-aid placed to injection site.     The patient was instructed to call our office if they had any questions or concerns.          Assessment / Plan      Assessment/Plan:   Diagnoses and all orders for this visit:    1. Acute pain of right shoulder  -     XR Shoulder 2+ View Right; Future  -     methylPREDNISolone acetate (DEPO-medrol) injection 40 mg    2. Primary hypertension    Other orders  -     hydroCHLOROthiazide (HYDRODIURIL) 25 MG tablet; Take 1 tablet by mouth Daily.  Dispense: 30 tablet; Refill: 2  -     diclofenac (VOLTAREN) 75 MG EC tablet;  Take 1 tablet by mouth 2 (Two) Times a Day.  Dispense: 60 tablet; Refill: 2    Acute pain of right shoulder will provide injection in office today we will obtain x-ray call with results if negative will consult physical therapy    HTN will recheck manually remains elevated will add hydrochlorothiazide to patient's losartan recommend blood pressure check in 2 weeks follow-up with PCP in 1 month      Follow Up:   Return in 1 month (on 11/21/2021), or if symptoms worsen or fail to improve.    Danika Tompkins, APRN      Please note that portions of this note may have been completed with a voice recognition program. Efforts were made to edit the dictations, but occasionally words are mistranscribed.

## 2021-10-21 NOTE — PATIENT INSTRUCTIONS
Shoulder Pain  Many things can cause shoulder pain, including:  · An injury.  · Moving the shoulder in the same way again and again (overuse).  · Joint pain (arthritis).  Pain can come from:  · Swelling and irritation (inflammation) of any part of the shoulder.  · An injury to the shoulder joint.  · An injury to:  ? Tissues that connect muscle to bone (tendons).  ? Tissues that connect bones to each other (ligaments).  ? Bones.  Follow these instructions at home:  Watch for changes in your symptoms. Let your doctor know about them. Follow these instructions to help with your pain.  If you have a sling:  · Wear the sling as told by your doctor. Remove it only as told by your doctor.  · Loosen the sling if your fingers:  ? Tingle.  ? Become numb.  ? Turn cold and blue.  · Keep the sling clean.  · If the sling is not waterproof:  ? Do not let it get wet.  ? Take the sling off when you shower or bathe.  Managing pain, stiffness, and swelling    · If told, put ice on the painful area:  ? Put ice in a plastic bag.  ? Place a towel between your skin and the bag.  ? Leave the ice on for 20 minutes, 2-3 times a day. Stop putting ice on if it does not help with the pain.  · Squeeze a soft ball or a foam pad as much as possible. This prevents swelling in the shoulder. It also helps to strengthen the arm.    General instructions  · Take over-the-counter and prescription medicines only as told by your doctor.  · Keep all follow-up visits as told by your doctor. This is important.  Contact a doctor if:  · Your pain gets worse.  · Medicine does not help your pain.  · You have new pain in your arm, hand, or fingers.  Get help right away if:  · Your arm, hand, or fingers:  ? Tingle.  ? Are numb.  ? Are swollen.  ? Are painful.  ? Turn white or blue.  Summary  · Shoulder pain can be caused by many things. These include injury, moving the shoulder in the same away again and again, and joint pain.  · Watch for changes in your symptoms.  Let your doctor know about them.  · This condition may be treated with a sling, ice, and pain medicine.  · Contact your doctor if the pain gets worse or you have new pain. Get help right away if your arm, hand, or fingers tingle or get numb, swollen, or painful.  · Keep all follow-up visits as told by your doctor. This is important.  This information is not intended to replace advice given to you by your health care provider. Make sure you discuss any questions you have with your health care provider.  Document Revised: 07/02/2019 Document Reviewed: 07/02/2019  Elsevier Patient Education © 2021 [employer]

## 2021-10-21 NOTE — TELEPHONE ENCOUNTER
----- Message from KELLY Palmer sent at 10/21/2021 10:01 AM EDT -----  No radiographic findings of acute osseous shoulder abnormality.  Mild acromioclavicular joint degeneration.  Will refer to physical therapy if symptoms persist will obtain MRI SHOULDER WITHOUT CONTRAST

## 2022-04-07 DIAGNOSIS — I10 PRIMARY HYPERTENSION: Primary | ICD-10-CM

## 2022-04-07 RX ORDER — LOSARTAN POTASSIUM 100 MG/1
TABLET ORAL
Qty: 90 TABLET | Refills: 3 | Status: SHIPPED | OUTPATIENT
Start: 2022-04-07

## 2022-07-14 RX ORDER — DICLOFENAC SODIUM 75 MG/1
75 TABLET, DELAYED RELEASE ORAL 2 TIMES DAILY PRN
Qty: 60 TABLET | Refills: 0 | Status: SHIPPED | OUTPATIENT
Start: 2022-07-14 | End: 2022-08-16

## 2022-07-14 NOTE — TELEPHONE ENCOUNTER
Caller: Kit Gama Gene    Relationship: Self    Best call back number: [phone]    Requested Prescriptions:   Requested Prescriptions     Pending Prescriptions Disp Refills   • diclofenac (VOLTAREN) 75 MG EC tablet 60 tablet 2     Sig: Take 1 tablet by mouth 2 (Two) Times a Day.        Pharmacy where request should be sent: [id number] - [place], [place] - [address] AT [address]/[facility] & [place] - [phone] [facility] [phone] FX     Additional details provided by patient: PATIENT COMPLETELY OUT OF MEDICATION    Does the patient have less than a 3 day supply:  [x] Yes  [] No    Roly Quiles Rep   07/14/22 12:46 EDT

## 2022-07-14 NOTE — TELEPHONE ENCOUNTER
NSAIDs Protocol Failed 07/14/2022 12:47 PM   Protocol Details  Normal serum potassium in past 12 months    GFR >45 ml/min in past year    HGB greater than 10 or HCT greater than 30 in past 9 months    Visit with authorizing provider in past 9 months or upcoming 90 days    AST less than 55 or ALT less than 90 in past 9 months

## 2022-08-15 ENCOUNTER — OFFICE VISIT (OUTPATIENT)
Dept: INTERNAL MEDICINE | Facility: CLINIC | Age: 62
End: 2022-08-15

## 2022-08-15 VITALS
TEMPERATURE: 97.5 F | BODY MASS INDEX: 36.88 KG/M2 | DIASTOLIC BLOOD PRESSURE: 98 MMHG | HEART RATE: 63 BPM | OXYGEN SATURATION: 96 % | HEIGHT: 67 IN | SYSTOLIC BLOOD PRESSURE: 172 MMHG | WEIGHT: 235 LBS

## 2022-08-15 DIAGNOSIS — R53.83 FATIGUE, UNSPECIFIED TYPE: ICD-10-CM

## 2022-08-15 DIAGNOSIS — R73.02 IMPAIRED GLUCOSE TOLERANCE: ICD-10-CM

## 2022-08-15 DIAGNOSIS — Z23 NEED FOR VACCINATION: ICD-10-CM

## 2022-08-15 DIAGNOSIS — Z12.5 PROSTATE CANCER SCREENING: ICD-10-CM

## 2022-08-15 DIAGNOSIS — R42 DIZZINESS: ICD-10-CM

## 2022-08-15 DIAGNOSIS — I10 PRIMARY HYPERTENSION: Primary | ICD-10-CM

## 2022-08-15 LAB
25(OH)D3 SERPL-MCNC: 24 NG/ML (ref 30–100)
BASOPHILS # BLD AUTO: 0.06 10*3/MM3 (ref 0–0.2)
BASOPHILS NFR BLD AUTO: 0.9 % (ref 0–1.5)
DEPRECATED RDW RBC AUTO: 42.9 FL (ref 37–54)
EOSINOPHIL # BLD AUTO: 0.33 10*3/MM3 (ref 0–0.4)
EOSINOPHIL NFR BLD AUTO: 4.9 % (ref 0.3–6.2)
ERYTHROCYTE [DISTWIDTH] IN BLOOD BY AUTOMATED COUNT: 13.2 % (ref 12.3–15.4)
FOLATE SERPL-MCNC: 6.69 NG/ML (ref 4.78–24.2)
HBA1C MFR BLD: 6.3 % (ref 4.8–5.6)
HCT VFR BLD AUTO: 46.4 % (ref 37.5–51)
HGB BLD-MCNC: 15.3 G/DL (ref 13–17.7)
IMM GRANULOCYTES # BLD AUTO: 0.01 10*3/MM3 (ref 0–0.05)
IMM GRANULOCYTES NFR BLD AUTO: 0.1 % (ref 0–0.5)
LYMPHOCYTES # BLD AUTO: 2.35 10*3/MM3 (ref 0.7–3.1)
LYMPHOCYTES NFR BLD AUTO: 34.6 % (ref 19.6–45.3)
MCH RBC QN AUTO: 29.5 PG (ref 26.6–33)
MCHC RBC AUTO-ENTMCNC: 33 G/DL (ref 31.5–35.7)
MCV RBC AUTO: 89.6 FL (ref 79–97)
MONOCYTES # BLD AUTO: 0.47 10*3/MM3 (ref 0.1–0.9)
MONOCYTES NFR BLD AUTO: 6.9 % (ref 5–12)
NEUTROPHILS NFR BLD AUTO: 3.57 10*3/MM3 (ref 1.7–7)
NEUTROPHILS NFR BLD AUTO: 52.6 % (ref 42.7–76)
NRBC BLD AUTO-RTO: 0 /100 WBC (ref 0–0.2)
PLATELET # BLD AUTO: 297 10*3/MM3 (ref 140–450)
PMV BLD AUTO: 10.3 FL (ref 6–12)
RBC # BLD AUTO: 5.18 10*6/MM3 (ref 4.14–5.8)
VIT B12 BLD-MCNC: 364 PG/ML (ref 211–946)
WBC NRBC COR # BLD: 6.79 10*3/MM3 (ref 3.4–10.8)

## 2022-08-15 PROCEDURE — 82746 ASSAY OF FOLIC ACID SERUM: CPT | Performed by: INTERNAL MEDICINE

## 2022-08-15 PROCEDURE — 84466 ASSAY OF TRANSFERRIN: CPT | Performed by: INTERNAL MEDICINE

## 2022-08-15 PROCEDURE — 82728 ASSAY OF FERRITIN: CPT | Performed by: INTERNAL MEDICINE

## 2022-08-15 PROCEDURE — 80061 LIPID PANEL: CPT | Performed by: INTERNAL MEDICINE

## 2022-08-15 PROCEDURE — 90471 IMMUNIZATION ADMIN: CPT | Performed by: INTERNAL MEDICINE

## 2022-08-15 PROCEDURE — 90677 PCV20 VACCINE IM: CPT | Performed by: INTERNAL MEDICINE

## 2022-08-15 PROCEDURE — 83540 ASSAY OF IRON: CPT | Performed by: INTERNAL MEDICINE

## 2022-08-15 PROCEDURE — 82607 VITAMIN B-12: CPT | Performed by: INTERNAL MEDICINE

## 2022-08-15 PROCEDURE — G0103 PSA SCREENING: HCPCS | Performed by: INTERNAL MEDICINE

## 2022-08-15 PROCEDURE — 99214 OFFICE O/P EST MOD 30 MIN: CPT | Performed by: INTERNAL MEDICINE

## 2022-08-15 PROCEDURE — 82306 VITAMIN D 25 HYDROXY: CPT | Performed by: INTERNAL MEDICINE

## 2022-08-15 PROCEDURE — 90714 TD VACC NO PRESV 7 YRS+ IM: CPT | Performed by: INTERNAL MEDICINE

## 2022-08-15 PROCEDURE — 83735 ASSAY OF MAGNESIUM: CPT | Performed by: INTERNAL MEDICINE

## 2022-08-15 PROCEDURE — 80050 GENERAL HEALTH PANEL: CPT | Performed by: INTERNAL MEDICINE

## 2022-08-15 PROCEDURE — 83036 HEMOGLOBIN GLYCOSYLATED A1C: CPT | Performed by: INTERNAL MEDICINE

## 2022-08-15 PROCEDURE — 90472 IMMUNIZATION ADMIN EACH ADD: CPT | Performed by: INTERNAL MEDICINE

## 2022-08-15 RX ORDER — HYDROCHLOROTHIAZIDE 25 MG/1
25 TABLET ORAL EVERY MORNING
Qty: 90 TABLET | Refills: 3 | Status: SHIPPED | OUTPATIENT
Start: 2022-08-15

## 2022-08-15 RX ORDER — AMLODIPINE BESYLATE 2.5 MG/1
2.5 TABLET ORAL DAILY
Qty: 90 TABLET | Refills: 3 | Status: SHIPPED | OUTPATIENT
Start: 2022-08-15 | End: 2022-09-12

## 2022-08-15 NOTE — PROGRESS NOTES
"Chief Complaint  Annual Exam and no balance  (Patient states he has been loosing his balance )    Subjective          Kit Gama presents to [facility]  History of Present Illness  Patient reports intermittent balance issues. Patient reports walking and needing something next to him. Patient denies dizziness. Patient notices it at work as well. Patient is no longer taking HCTZ. When falling, he always falling to his right side.   hypertension- patient is on losartan. Patient denies chest pain, shortness of breath, Has. Patient without home Blood Pressure measurements recently.      Current Outpatient Medications   Medication Instructions   • amLODIPine (NORVASC) 2.5 mg, Oral, Daily   • diclofenac (VOLTAREN) 75 mg, Oral, 2 Times Daily PRN   • hydroCHLOROthiazide (HYDRODIURIL) 25 mg, Oral, Every Morning   • losartan (COZAAR) 100 MG tablet TAKE 1 TABLET BY MOUTH DAILY       The following portions of the patient's history were reviewed and updated as appropriate: allergies, current medications, past family history, past medical history, past social history, past surgical history, and problem list.    Objective   Vital Signs:   /98 (BP Location: Left arm)   Pulse 63   Temp 97.5 °F (36.4 °C) (Temporal)   Ht 170.2 cm (67\")   Wt 107 kg (235 lb)   SpO2 96%   BMI 36.81 kg/m²     Wt Readings from Last 3 Encounters:   08/15/22 107 kg (235 lb)   10/21/21 105 kg (231 lb)   05/13/21 105 kg (231 lb 1 oz)     BP Readings from Last 3 Encounters:   08/15/22 172/98   10/21/21 148/88   05/13/21 166/94     Physical Exam   Appearance: No acute distress, well-nourished  Head: normocephalic, atraumatic  Eyes: extraocular movements intact, no scleral icterus, no conjunctival injection  Ears, Nose, and Throat: external ears normal, nares patent, moist mucous membranes  Cardiovascular: regular rate and rhythm. no murmurs, rubs, or gallops. no edema  Respiratory: breathing " comfortably, symmetric chest rise, clear to auscultation bilaterally. No wheezes, rales, or rhonchi.  Neuro: alert and oriented to time, place, and person. Normal gait  Psych: normal mood and affect        Assessment and Plan    Diagnoses and all orders for this visit:    1. Primary hypertension (Primary)  Comments:  given elevation, will start amlodipine and restart HCTZ. cont losartan. f/u in 3-4 weeks for repeat eval.   Orders:  -     Comprehensive Metabolic Panel  -     CBC & Differential  -     hydroCHLOROthiazide (HYDRODIURIL) 25 MG tablet; Take 1 tablet by mouth Every Morning.  Dispense: 90 tablet; Refill: 3  -     amLODIPine (NORVASC) 2.5 MG tablet; Take 1 tablet by mouth Daily.  Dispense: 90 tablet; Refill: 3    2. Impaired glucose tolerance  -     Hemoglobin A1c  -     Lipid Panel    3. Dizziness  Comments:  obtain labs and MRI to further eval.   Orders:  -     Magnesium  -     TSH  -     MRI Brain Without Contrast; Future    4. Need for vaccination  -     Pneumococcal Conjugate Vaccine 20-Valent (PCV20)  -     Td Vaccine Greater Than or Equal To 6yo With Preservative IM    5. Fatigue, unspecified type  Comments:  labs as ordered. consider sleep referral if normal.   Orders:  -     Vitamin B12 & Folate  -     Vitamin D 25 Hydroxy  -     Ferritin  -     Iron Profile    6. Prostate cancer screening  -     PSA Screen          Medications Discontinued During This Encounter   Medication Reason   • hydroCHLOROthiazide (HYDRODIURIL) 25 MG tablet Reorder          Follow Up   Return in about 4 weeks (around 9/12/2022) for Recheck, HTN.  Patient was given instructions and counseling regarding his condition or for health maintenance advice. Please see specific information pulled into the AVS if appropriate.       Blu Engel Jr, MD  08/15/22  09:23 EDT

## 2022-08-16 LAB
ALBUMIN SERPL-MCNC: 3.9 G/DL (ref 3.5–5.2)
ALBUMIN/GLOB SERPL: 1.4 G/DL
ALP SERPL-CCNC: 81 U/L (ref 39–117)
ALT SERPL W P-5'-P-CCNC: 20 U/L (ref 1–41)
ANION GAP SERPL CALCULATED.3IONS-SCNC: 12.4 MMOL/L (ref 5–15)
AST SERPL-CCNC: 18 U/L (ref 1–40)
BILIRUB SERPL-MCNC: 0.2 MG/DL (ref 0–1.2)
BUN SERPL-MCNC: 12 MG/DL (ref 8–23)
BUN/CREAT SERPL: 9.8 (ref 7–25)
CALCIUM SPEC-SCNC: 9 MG/DL (ref 8.6–10.5)
CHLORIDE SERPL-SCNC: 103 MMOL/L (ref 98–107)
CHOLEST SERPL-MCNC: 136 MG/DL (ref 0–200)
CO2 SERPL-SCNC: 21.6 MMOL/L (ref 22–29)
CREAT SERPL-MCNC: 1.23 MG/DL (ref 0.76–1.27)
EGFRCR SERPLBLD CKD-EPI 2021: 67.2 ML/MIN/1.73
FERRITIN SERPL-MCNC: 182 NG/ML (ref 30–400)
GLOBULIN UR ELPH-MCNC: 2.7 GM/DL
GLUCOSE SERPL-MCNC: 99 MG/DL (ref 65–99)
HDLC SERPL-MCNC: 29 MG/DL (ref 40–60)
IRON 24H UR-MRATE: 97 MCG/DL (ref 59–158)
IRON SATN MFR SERPL: 26 % (ref 20–50)
LDLC SERPL CALC-MCNC: 82 MG/DL (ref 0–100)
LDLC/HDLC SERPL: 2.71 {RATIO}
MAGNESIUM SERPL-MCNC: 1.8 MG/DL (ref 1.6–2.4)
POTASSIUM SERPL-SCNC: 4.5 MMOL/L (ref 3.5–5.2)
PROT SERPL-MCNC: 6.6 G/DL (ref 6–8.5)
PSA SERPL-MCNC: 0.33 NG/ML (ref 0–4)
SODIUM SERPL-SCNC: 137 MMOL/L (ref 136–145)
TIBC SERPL-MCNC: 377 MCG/DL (ref 298–536)
TRANSFERRIN SERPL-MCNC: 253 MG/DL (ref 200–360)
TRIGL SERPL-MCNC: 142 MG/DL (ref 0–150)
TSH SERPL DL<=0.05 MIU/L-ACNC: 2.93 UIU/ML (ref 0.27–4.2)
VLDLC SERPL-MCNC: 25 MG/DL (ref 5–40)

## 2022-08-16 RX ORDER — ERGOCALCIFEROL 1.25 MG/1
50000 CAPSULE ORAL WEEKLY
Qty: 13 CAPSULE | Refills: 3 | Status: SHIPPED | OUTPATIENT
Start: 2022-08-16

## 2022-08-16 RX ORDER — MAGNESIUM OXIDE 400 MG/1
400 TABLET ORAL DAILY
Qty: 90 TABLET | Refills: 3 | Status: SHIPPED | OUTPATIENT
Start: 2022-08-16

## 2022-08-16 RX ORDER — LANOLIN ALCOHOL/MO/W.PET/CERES
1000 CREAM (GRAM) TOPICAL DAILY
Qty: 90 TABLET | Refills: 3 | Status: SHIPPED | OUTPATIENT
Start: 2022-08-16

## 2022-08-16 RX ORDER — DICLOFENAC SODIUM 75 MG/1
TABLET, DELAYED RELEASE ORAL
Qty: 60 TABLET | Refills: 1 | Status: SHIPPED | OUTPATIENT
Start: 2022-08-16 | End: 2022-09-12 | Stop reason: SDUPTHER

## 2022-08-16 NOTE — TELEPHONE ENCOUNTER
NSAIDs Protocol Passed 08/16/2022 06:22 AM   Protocol Details  Normal serum potassium in past 12 months    GFR >45 ml/min in past year    HGB greater than 10 or HCT greater than 30 in past 9 months    Visit with authorizing provider in past 9 months or upcoming 90 days    AST less than 55 or ALT less than 90 in past 9 months     QTY 60 WITH ZERO REFILLS     PLEASE ADVISE

## 2022-09-07 ENCOUNTER — HOSPITAL ENCOUNTER (OUTPATIENT)
Dept: MRI IMAGING | Facility: HOSPITAL | Age: 62
Discharge: HOME | End: 2022-09-07
Admitting: INTERNAL MEDICINE

## 2022-09-07 DIAGNOSIS — R42 DIZZINESS: ICD-10-CM

## 2022-09-07 PROCEDURE — 70551 MRI BRAIN STEM W/O DYE: CPT

## 2022-09-12 ENCOUNTER — OFFICE VISIT (OUTPATIENT)
Dept: INTERNAL MEDICINE | Facility: CLINIC | Age: 62
End: 2022-09-12

## 2022-09-12 VITALS
BODY MASS INDEX: 36.1 KG/M2 | OXYGEN SATURATION: 100 % | HEIGHT: 67 IN | TEMPERATURE: 97.6 F | WEIGHT: 230 LBS | SYSTOLIC BLOOD PRESSURE: 145 MMHG | HEART RATE: 74 BPM | DIASTOLIC BLOOD PRESSURE: 82 MMHG

## 2022-09-12 DIAGNOSIS — M19.90 ARTHRITIS: ICD-10-CM

## 2022-09-12 DIAGNOSIS — R73.02 IMPAIRED GLUCOSE TOLERANCE: ICD-10-CM

## 2022-09-12 DIAGNOSIS — Z23 NEED FOR VACCINATION: ICD-10-CM

## 2022-09-12 DIAGNOSIS — I10 PRIMARY HYPERTENSION: Primary | ICD-10-CM

## 2022-09-12 PROCEDURE — 99214 OFFICE O/P EST MOD 30 MIN: CPT | Performed by: INTERNAL MEDICINE

## 2022-09-12 PROCEDURE — 90471 IMMUNIZATION ADMIN: CPT | Performed by: INTERNAL MEDICINE

## 2022-09-12 PROCEDURE — 90750 HZV VACC RECOMBINANT IM: CPT | Performed by: INTERNAL MEDICINE

## 2022-09-12 RX ORDER — DICLOFENAC SODIUM 75 MG/1
75 TABLET, DELAYED RELEASE ORAL 2 TIMES DAILY PRN
Qty: 180 TABLET | Refills: 1 | Status: SHIPPED | OUTPATIENT
Start: 2022-09-12 | End: 2023-03-13

## 2022-09-12 RX ORDER — AMLODIPINE BESYLATE 5 MG/1
5 TABLET ORAL DAILY
Qty: 90 TABLET | Refills: 1 | Status: SHIPPED | OUTPATIENT
Start: 2022-09-12 | End: 2022-11-17

## 2022-09-12 NOTE — PROGRESS NOTES
"Chief Complaint  Hypertension (4 week follow-up )    Subjective          Kit Gama presents to [facility]  History of Present Illness  hypertension- patient thinks dizziness is improved. Patient denies Has, chest pain. Patient does notice diuresis with HCTZ. Patient reports home Blood Pressure readings are variable if checked right after work.   impaired glucose tolerance- patient doing well on metformin. Patient has lost weight.     Current Outpatient Medications   Medication Instructions   • amLODIPine (NORVASC) 5 mg, Oral, Daily   • diclofenac (VOLTAREN) 75 mg, Oral, 2 Times Daily PRN   • hydroCHLOROthiazide (HYDRODIURIL) 25 mg, Oral, Every Morning   • losartan (COZAAR) 100 MG tablet TAKE 1 TABLET BY MOUTH DAILY   • magnesium oxide (MAG-OX) 400 mg, Oral, Daily   • metFORMIN (GLUCOPHAGE) 500 mg, Oral, 2 Times Daily With Meals   • vitamin B-12 (CYANOCOBALAMIN) 1,000 mcg, Oral, Daily   • vitamin D (ERGOCALCIFEROL) 50,000 Units, Oral, Weekly       The following portions of the patient's history were reviewed and updated as appropriate: allergies, current medications, past family history, past medical history, past social history, past surgical history, and problem list.    Objective   Vital Signs:   /82 (BP Location: Right arm, Patient Position: Sitting, Cuff Size: Adult)   Pulse 74   Temp 97.6 °F (36.4 °C) (Temporal)   Ht 170.2 cm (67\")   Wt 104 kg (230 lb)   SpO2 100%   BMI 36.02 kg/m²     Wt Readings from Last 3 Encounters:   09/12/22 104 kg (230 lb)   08/15/22 107 kg (235 lb)   10/21/21 105 kg (231 lb)     BP Readings from Last 3 Encounters:   09/12/22 145/82   08/15/22 172/98   10/21/21 148/88     Physical Exam   Appearance: No acute distress, well-nourished  Head: normocephalic, atraumatic  Eyes: extraocular movements intact, no scleral icterus, no conjunctival injection  Ears, Nose, and Throat: external ears normal, nares patent, moist mucous " membranes  Cardiovascular: regular rate and rhythm. no murmurs, rubs, or gallops. no edema  Respiratory: breathing comfortably, symmetric chest rise, clear to auscultation bilaterally. No wheezes, rales, or rhonchi.  Neuro: alert and oriented to time, place, and person. Normal gait  Psych: normal mood and affect     Result Review :   The following data was reviewed by: Blu Engel Jr, MD on 09/12/2022:  Common labs    Common Labsle 8/15/22 8/15/22 8/15/22 8/15/22 8/15/22    0922 0922 0922 0922 0922   Glucose    99    BUN    12    Creatinine    1.23    Sodium    137    Potassium    4.5    Chloride    103    Calcium    9.0    Albumin    3.90    Total Bilirubin    0.2    Alkaline Phosphatase    81    AST (SGOT)    18    ALT (SGPT)    20    WBC 6.79       Hemoglobin 15.3       Hematocrit 46.4       Platelets 297       Total Cholesterol   136     Triglycerides   142     HDL Cholesterol   29 (A)     LDL Cholesterol    82     Hemoglobin A1C  6.30 (A)      PSA     0.329   (A) Abnormal value               Assessment and Plan    Diagnoses and all orders for this visit:    1. Primary hypertension (Primary)  Comments:  inc amlodipine to 5mg daily for further control. f/u in 2 months for repeat eval.   Orders:  -     amLODIPine (NORVASC) 5 MG tablet; Take 1 tablet by mouth Daily.  Dispense: 90 tablet; Refill: 1    2. Need for vaccination  -     Shingrix Vaccine    3. Arthritis  -     diclofenac (VOLTAREN) 75 MG EC tablet; Take 1 tablet by mouth 2 (Two) Times a Day As Needed (pain).  Dispense: 180 tablet; Refill: 1    4. Impaired glucose tolerance  Comments:  discussed weight loss. cont metformin.           Medications Discontinued During This Encounter   Medication Reason   • amLODIPine (NORVASC) 2.5 MG tablet    • diclofenac (VOLTAREN) 75 MG EC tablet Reorder        Follow Up   Return in about 2 months (around 11/12/2022) for HTN, DM.  Patient was given instructions and counseling regarding his condition or for  health maintenance advice. Please see specific information pulled into the AVS if appropriate.       Blu Engel Jr, MD  09/12/22  09:37 EDT

## 2022-11-17 ENCOUNTER — OFFICE VISIT (OUTPATIENT)
Dept: INTERNAL MEDICINE | Facility: CLINIC | Age: 62
End: 2022-11-17

## 2022-11-17 VITALS
TEMPERATURE: 96.8 F | BODY MASS INDEX: 36.32 KG/M2 | HEIGHT: 67 IN | SYSTOLIC BLOOD PRESSURE: 147 MMHG | DIASTOLIC BLOOD PRESSURE: 83 MMHG | WEIGHT: 231.4 LBS | HEART RATE: 60 BPM | OXYGEN SATURATION: 98 %

## 2022-11-17 DIAGNOSIS — I10 PRIMARY HYPERTENSION: Primary | ICD-10-CM

## 2022-11-17 DIAGNOSIS — R07.89 CHEST DISCOMFORT: ICD-10-CM

## 2022-11-17 PROCEDURE — 99214 OFFICE O/P EST MOD 30 MIN: CPT | Performed by: INTERNAL MEDICINE

## 2022-11-17 PROCEDURE — 93000 ELECTROCARDIOGRAM COMPLETE: CPT | Performed by: INTERNAL MEDICINE

## 2022-11-17 RX ORDER — AMLODIPINE BESYLATE 10 MG/1
10 TABLET ORAL DAILY
Qty: 90 TABLET | Refills: 3 | Status: SHIPPED | OUTPATIENT
Start: 2022-11-17

## 2022-11-17 NOTE — PROGRESS NOTES
"Chief Complaint  Hypertension (FOLLOW UP )    Subjective          Kti Gama presents to [facility]  History of Present Illness  hypertension- patient denies Has, dizziness, chest pain. Patient reports home Blood Pressure readings with -136 typically. Patient does report having some cramps. He has been using cream to help.   Patient does report having chest \"cramps\" across chest x2 episodes. He was at work when this happened. Patient denies nausea, diaphoresis, radiation of pain. Patient reports previous stress test several years ago.     Current Outpatient Medications   Medication Instructions   • amLODIPine (NORVASC) 10 mg, Oral, Daily   • diclofenac (VOLTAREN) 75 mg, Oral, 2 Times Daily PRN   • hydroCHLOROthiazide (HYDRODIURIL) 25 mg, Oral, Every Morning   • losartan (COZAAR) 100 MG tablet TAKE 1 TABLET BY MOUTH DAILY   • magnesium oxide (MAG-OX) 400 mg, Oral, Daily   • metFORMIN (GLUCOPHAGE) 500 mg, Oral, 2 Times Daily With Meals   • vitamin B-12 (CYANOCOBALAMIN) 1,000 mcg, Oral, Daily   • vitamin D (ERGOCALCIFEROL) 50,000 Units, Oral, Weekly       The following portions of the patient's history were reviewed and updated as appropriate: allergies, current medications, past family history, past medical history, past social history, past surgical history, and problem list.    Objective   Vital Signs:   /83 (BP Location: Left arm)   Pulse 60   Temp 96.8 °F (36 °C) (Temporal)   Ht 170.2 cm (67\")   Wt 105 kg (231 lb 6.4 oz)   SpO2 98%   BMI 36.24 kg/m²     Wt Readings from Last 3 Encounters:   11/17/22 105 kg (231 lb 6.4 oz)   09/12/22 104 kg (230 lb)   08/15/22 107 kg (235 lb)     BP Readings from Last 3 Encounters:   11/17/22 147/83   09/12/22 145/82   08/15/22 172/98     Physical Exam   Appearance: No acute distress, well-nourished  Head: normocephalic, atraumatic  Eyes: extraocular movements intact, no scleral icterus, no conjunctival " injection  Ears, Nose, and Throat: external ears normal, nares patent, moist mucous membranes  Cardiovascular: regular rate and rhythm. no murmurs, rubs, or gallops. no edema  Respiratory: breathing comfortably, symmetric chest rise, clear to auscultation bilaterally. No wheezes, rales, or rhonchi.  Neuro: alert and oriented to time, place, and person. Normal gait  Psych: normal mood and affect     Result Review :   The following data was reviewed by: Blu Engel Jr, MD on 11/17/2022:  Common labs    Common Labs 8/15/22 8/15/22 8/15/22 8/15/22 8/15/22    0922 0922 0922 0922 0922   Glucose    99    BUN    12    Creatinine    1.23    Sodium    137    Potassium    4.5    Chloride    103    Calcium    9.0    Albumin    3.90    Total Bilirubin    0.2    Alkaline Phosphatase    81    AST (SGOT)    18    ALT (SGPT)    20    WBC 6.79       Hemoglobin 15.3       Hematocrit 46.4       Platelets 297       Total Cholesterol   136     Triglycerides   142     HDL Cholesterol   29 (A)     LDL Cholesterol    82     Hemoglobin A1C  6.30 (A)      PSA     0.329   (A) Abnormal value              No results found for: SARSANTIGEN, COVID19, RAPFLUA, RAPFLUB, FLUAAG, FLUABDAG, FLUABDAG, FLU, FLU, FLUBAG, RAPSCRN, STREPAAG, RSV, POCPREGUR, MONOSPOT, INR, LEADCAPBLD, POCLEAD, BILIRUBINUR      ECG 12 Lead    Date/Time: 11/17/2022 9:39 AM  Performed by: Blu Engel Jr., MD  Authorized by: Blu Engel Jr., MD   Previous ECG: no previous ECG available  Rhythm: sinus bradycardia  Rate: normal  Conduction: 1st degree AV block  ST Segments: ST segments normal  T Waves: T waves normal  QRS axis: left    Clinical impression: normal ECG           Assessment and Plan    Diagnoses and all orders for this visit:    1. Primary hypertension (Primary)  Comments:  inc amlodipine to 10mg daily. cont monitoring. goal BP <130/80  Orders:  -     amLODIPine (NORVASC) 10 MG tablet; Take 1 tablet by mouth Daily.  Dispense: 90  tablet; Refill: 3    2. Chest discomfort  Comments:  EKG in clinic reassuring. obtain treadmill stress test to further eval.  Orders:  -     ECG 12 Lead  -     Treadmill Stress Test; Future        Medications Discontinued During This Encounter   Medication Reason   • amLODIPine (NORVASC) 5 MG tablet           Follow Up   Return in about 3 months (around 2/17/2023) for Recheck, HTN.  Patient was given instructions and counseling regarding his condition or for health maintenance advice. Please see specific information pulled into the AVS if appropriate.       Blu Engel Jr, MD  11/17/22  09:43 EST

## 2023-03-12 DIAGNOSIS — M19.90 ARTHRITIS: ICD-10-CM

## 2023-03-12 DIAGNOSIS — I10 PRIMARY HYPERTENSION: ICD-10-CM

## 2023-03-13 RX ORDER — DICLOFENAC SODIUM 75 MG/1
TABLET, DELAYED RELEASE ORAL
Qty: 180 TABLET | Refills: 1 | Status: SHIPPED | OUTPATIENT
Start: 2023-03-13

## 2023-03-13 RX ORDER — AMLODIPINE BESYLATE 5 MG/1
TABLET ORAL
Qty: 90 TABLET | Refills: 1 | OUTPATIENT
Start: 2023-03-13

## 2023-03-15 ENCOUNTER — TELEPHONE (OUTPATIENT)
Dept: INTERNAL MEDICINE | Facility: CLINIC | Age: 63
End: 2023-03-15
Payer: COMMERCIAL

## 2023-04-26 DIAGNOSIS — I10 PRIMARY HYPERTENSION: ICD-10-CM

## 2023-04-27 RX ORDER — LOSARTAN POTASSIUM 100 MG/1
TABLET ORAL
Qty: 90 TABLET | Refills: 3 | Status: SHIPPED | OUTPATIENT
Start: 2023-04-27

## 2023-05-04 DIAGNOSIS — I10 PRIMARY HYPERTENSION: ICD-10-CM

## 2023-05-05 RX ORDER — AMLODIPINE BESYLATE 10 MG/1
10 TABLET ORAL DAILY
Qty: 90 TABLET | Refills: 3 | Status: SHIPPED | OUTPATIENT
Start: 2023-05-05

## 2023-05-05 RX ORDER — AMLODIPINE BESYLATE 10 MG/1
10 TABLET ORAL DAILY
OUTPATIENT
Start: 2023-05-05

## 2023-05-05 NOTE — TELEPHONE ENCOUNTER
Rx Refill Note  Requested Prescriptions     Pending Prescriptions Disp Refills   • amLODIPine (NORVASC) 10 MG tablet 90 tablet 3     Sig: Take 1 tablet by mouth Daily.     Refused Prescriptions Disp Refills   • amLODIPine (NORVASC) 5 MG tablet [Pharmacy Med Name: AMLODIPINE BESYLATE 5 MG TAB] 90 tablet 1     Sig: TAKE 1 TABLET BY MOUTH EVERY DAY      Last office visit with prescribing clinician: 11/17/2022   Last telemedicine visit with prescribing clinician: Visit date not found   Next office visit with prescribing clinician: Visit date not found                         Would you like a call back once the refill request has been completed: [] Yes [] No    If the office needs to give you a call back, can they leave a voicemail: [] Yes [] No    Gerardo Barnett  05/05/23, 07:40 EDT

## 2023-09-01 ENCOUNTER — OFFICE VISIT (OUTPATIENT)
Dept: INTERNAL MEDICINE | Facility: CLINIC | Age: 63
End: 2023-09-01
Payer: COMMERCIAL

## 2023-09-01 VITALS
DIASTOLIC BLOOD PRESSURE: 76 MMHG | WEIGHT: 228 LBS | BODY MASS INDEX: 35.79 KG/M2 | HEIGHT: 67 IN | OXYGEN SATURATION: 96 % | SYSTOLIC BLOOD PRESSURE: 114 MMHG | HEART RATE: 74 BPM | RESPIRATION RATE: 20 BRPM | TEMPERATURE: 98.2 F

## 2023-09-01 DIAGNOSIS — R05.1 ACUTE COUGH: Primary | ICD-10-CM

## 2023-09-01 DIAGNOSIS — Z20.822 CLOSE EXPOSURE TO COVID-19 VIRUS: ICD-10-CM

## 2023-09-01 DIAGNOSIS — U07.1 COVID-19 VIRUS INFECTION: ICD-10-CM

## 2023-09-01 LAB
EXPIRATION DATE: ABNORMAL
EXPIRATION DATE: NORMAL
FLUAV AG NPH QL: NEGATIVE
FLUAV AG UPPER RESP QL IA.RAPID: NOT DETECTED
FLUBV AG NPH QL: NEGATIVE
FLUBV AG UPPER RESP QL IA.RAPID: NOT DETECTED
INTERNAL CONTROL: ABNORMAL
INTERNAL CONTROL: NORMAL
Lab: ABNORMAL
Lab: NORMAL
SARS-COV-2 AG UPPER RESP QL IA.RAPID: DETECTED

## 2023-09-03 DIAGNOSIS — I10 PRIMARY HYPERTENSION: ICD-10-CM

## 2023-09-05 RX ORDER — AMLODIPINE BESYLATE 10 MG/1
10 TABLET ORAL DAILY
Qty: 90 TABLET | Refills: 3 | Status: SHIPPED | OUTPATIENT
Start: 2023-09-05

## 2023-09-05 RX ORDER — AMLODIPINE BESYLATE 2.5 MG/1
TABLET ORAL
Qty: 90 TABLET | Refills: 3 | OUTPATIENT
Start: 2023-09-05

## 2023-09-22 RX ORDER — ERGOCALCIFEROL 1.25 MG/1
CAPSULE ORAL
Qty: 13 CAPSULE | Refills: 3 | Status: SHIPPED | OUTPATIENT
Start: 2023-09-22

## 2023-10-01 DIAGNOSIS — I10 PRIMARY HYPERTENSION: ICD-10-CM

## 2023-10-01 DIAGNOSIS — M19.90 ARTHRITIS: ICD-10-CM

## 2023-10-03 RX ORDER — HYDROCHLOROTHIAZIDE 25 MG/1
TABLET ORAL
Qty: 90 TABLET | Refills: 3 | Status: SHIPPED | OUTPATIENT
Start: 2023-10-03

## 2023-10-03 RX ORDER — DICLOFENAC SODIUM 75 MG/1
TABLET, DELAYED RELEASE ORAL
Qty: 180 TABLET | Refills: 1 | Status: SHIPPED | OUTPATIENT
Start: 2023-10-03

## 2023-10-19 ENCOUNTER — OFFICE VISIT (OUTPATIENT)
Dept: INTERNAL MEDICINE | Facility: CLINIC | Age: 63
End: 2023-10-19
Payer: COMMERCIAL

## 2023-10-19 VITALS
TEMPERATURE: 97 F | BODY MASS INDEX: 35.56 KG/M2 | HEART RATE: 64 BPM | SYSTOLIC BLOOD PRESSURE: 136 MMHG | WEIGHT: 226.6 LBS | DIASTOLIC BLOOD PRESSURE: 78 MMHG | OXYGEN SATURATION: 97 % | HEIGHT: 67 IN

## 2023-10-19 DIAGNOSIS — R73.02 IMPAIRED GLUCOSE TOLERANCE: ICD-10-CM

## 2023-10-19 DIAGNOSIS — Z23 NEED FOR INFLUENZA VACCINATION: ICD-10-CM

## 2023-10-19 DIAGNOSIS — E55.9 VITAMIN D DEFICIENCY: ICD-10-CM

## 2023-10-19 DIAGNOSIS — R60.0 LOCALIZED EDEMA: Primary | ICD-10-CM

## 2023-10-19 DIAGNOSIS — I10 PRIMARY HYPERTENSION: ICD-10-CM

## 2023-10-19 LAB
25(OH)D3 SERPL-MCNC: 52.6 NG/ML (ref 30–100)
ALBUMIN SERPL-MCNC: 4.3 G/DL (ref 3.5–5.2)
ALBUMIN UR-MCNC: 1.4 MG/DL
ALBUMIN/GLOB SERPL: 1.5 G/DL
ALP SERPL-CCNC: 87 U/L (ref 39–117)
ALT SERPL W P-5'-P-CCNC: 34 U/L (ref 1–41)
ANION GAP SERPL CALCULATED.3IONS-SCNC: 12.6 MMOL/L (ref 5–15)
AST SERPL-CCNC: 29 U/L (ref 1–40)
BASOPHILS # BLD AUTO: 0.06 10*3/MM3 (ref 0–0.2)
BASOPHILS NFR BLD AUTO: 0.8 % (ref 0–1.5)
BILIRUB SERPL-MCNC: 0.6 MG/DL (ref 0–1.2)
BUN SERPL-MCNC: 16 MG/DL (ref 8–23)
BUN/CREAT SERPL: 12.7 (ref 7–25)
CALCIUM SPEC-SCNC: 9.8 MG/DL (ref 8.6–10.5)
CHLORIDE SERPL-SCNC: 101 MMOL/L (ref 98–107)
CHOLEST SERPL-MCNC: 147 MG/DL (ref 0–200)
CO2 SERPL-SCNC: 24.4 MMOL/L (ref 22–29)
CREAT SERPL-MCNC: 1.26 MG/DL (ref 0.76–1.27)
CREAT UR-MCNC: 111.5 MG/DL
DEPRECATED RDW RBC AUTO: 41.6 FL (ref 37–54)
EGFRCR SERPLBLD CKD-EPI 2021: 64.9 ML/MIN/1.73
EOSINOPHIL # BLD AUTO: 0.46 10*3/MM3 (ref 0–0.4)
EOSINOPHIL NFR BLD AUTO: 5.9 % (ref 0.3–6.2)
ERYTHROCYTE [DISTWIDTH] IN BLOOD BY AUTOMATED COUNT: 13.1 % (ref 12.3–15.4)
GLOBULIN UR ELPH-MCNC: 2.9 GM/DL
GLUCOSE SERPL-MCNC: 109 MG/DL (ref 65–99)
HBA1C MFR BLD: 6.2 % (ref 4.8–5.6)
HCT VFR BLD AUTO: 44.4 % (ref 37.5–51)
HDLC SERPL-MCNC: 34 MG/DL (ref 40–60)
HGB BLD-MCNC: 15.4 G/DL (ref 13–17.7)
IMM GRANULOCYTES # BLD AUTO: 0.02 10*3/MM3 (ref 0–0.05)
IMM GRANULOCYTES NFR BLD AUTO: 0.3 % (ref 0–0.5)
LDLC SERPL CALC-MCNC: 93 MG/DL (ref 0–100)
LDLC/HDLC SERPL: 2.68 {RATIO}
LYMPHOCYTES # BLD AUTO: 2.22 10*3/MM3 (ref 0.7–3.1)
LYMPHOCYTES NFR BLD AUTO: 28.6 % (ref 19.6–45.3)
MCH RBC QN AUTO: 30.7 PG (ref 26.6–33)
MCHC RBC AUTO-ENTMCNC: 34.7 G/DL (ref 31.5–35.7)
MCV RBC AUTO: 88.4 FL (ref 79–97)
MICROALBUMIN/CREAT UR: 12.6 MG/G (ref 0–29)
MONOCYTES # BLD AUTO: 0.57 10*3/MM3 (ref 0.1–0.9)
MONOCYTES NFR BLD AUTO: 7.3 % (ref 5–12)
NEUTROPHILS NFR BLD AUTO: 4.44 10*3/MM3 (ref 1.7–7)
NEUTROPHILS NFR BLD AUTO: 57.1 % (ref 42.7–76)
NRBC BLD AUTO-RTO: 0 /100 WBC (ref 0–0.2)
NT-PROBNP SERPL-MCNC: <36 PG/ML (ref 0–900)
PLATELET # BLD AUTO: 337 10*3/MM3 (ref 140–450)
PMV BLD AUTO: 10.6 FL (ref 6–12)
POTASSIUM SERPL-SCNC: 4.3 MMOL/L (ref 3.5–5.2)
PROT SERPL-MCNC: 7.2 G/DL (ref 6–8.5)
RBC # BLD AUTO: 5.02 10*6/MM3 (ref 4.14–5.8)
SODIUM SERPL-SCNC: 138 MMOL/L (ref 136–145)
TRIGL SERPL-MCNC: 110 MG/DL (ref 0–150)
TSH SERPL DL<=0.05 MIU/L-ACNC: 1.56 UIU/ML (ref 0.27–4.2)
VLDLC SERPL-MCNC: 20 MG/DL (ref 5–40)
WBC NRBC COR # BLD: 7.77 10*3/MM3 (ref 3.4–10.8)

## 2023-10-19 PROCEDURE — 83036 HEMOGLOBIN GLYCOSYLATED A1C: CPT | Performed by: INTERNAL MEDICINE

## 2023-10-19 PROCEDURE — 82570 ASSAY OF URINE CREATININE: CPT | Performed by: INTERNAL MEDICINE

## 2023-10-19 PROCEDURE — 80061 LIPID PANEL: CPT | Performed by: INTERNAL MEDICINE

## 2023-10-19 PROCEDURE — 82043 UR ALBUMIN QUANTITATIVE: CPT | Performed by: INTERNAL MEDICINE

## 2023-10-19 PROCEDURE — 82306 VITAMIN D 25 HYDROXY: CPT | Performed by: INTERNAL MEDICINE

## 2023-10-19 PROCEDURE — 83880 ASSAY OF NATRIURETIC PEPTIDE: CPT | Performed by: INTERNAL MEDICINE

## 2023-10-19 PROCEDURE — 80050 GENERAL HEALTH PANEL: CPT | Performed by: INTERNAL MEDICINE

## 2023-10-19 RX ORDER — FUROSEMIDE 40 MG/1
40 TABLET ORAL DAILY
Qty: 30 TABLET | Refills: 0 | Status: SHIPPED | OUTPATIENT
Start: 2023-10-19

## 2023-10-19 NOTE — PROGRESS NOTES
"Chief Complaint  Foot Swelling (Swelling and some numbness ) and Leg Swelling (Numbing in legs when swollen )    Subjective          Kit Gama presents to [facility]  History of Present Illness  Patient reports BLE swelling in feet and associated numbness. No discoloration or fevers. Patient does report recent sodium load with foods.     Current Outpatient Medications   Medication Instructions    amLODIPine (NORVASC) 10 mg, Oral, Daily    diclofenac (VOLTAREN) 75 MG EC tablet TAKE 1 TABLET BY MOUTH TWICE A DAY AS NEEDED FOR PAIN    furosemide (LASIX) 40 mg, Oral, Daily    hydroCHLOROthiazide (HYDRODIURIL) 25 MG tablet TAKE 1 TABLET BY MOUTH EVERY DAY IN THE MORNING    losartan (COZAAR) 100 MG tablet TAKE 1 TABLET BY MOUTH EVERY DAY    magnesium oxide (MAG-OX) 400 mg, Oral, Daily    metFORMIN (GLUCOPHAGE) 500 mg, Oral, 2 Times Daily With Meals    vitamin B-12 (CYANOCOBALAMIN) 1,000 mcg, Oral, Daily    vitamin D (ERGOCALCIFEROL) 1.25 MG (18387 UT) capsule capsule TAKE 1 CAPSULE BY MOUTH 1 TIME PER WEEK.       The following portions of the patient's history were reviewed and updated as appropriate: allergies, current medications, past family history, past medical history, past social history, past surgical history, and problem list.    Objective   Vital Signs:   /78 (BP Location: Right arm, Patient Position: Sitting, Cuff Size: Large Adult)   Pulse 64   Temp 97 °F (36.1 °C) (Temporal)   Ht 170.2 cm (67\")   Wt 103 kg (226 lb 9.6 oz)   SpO2 97%   BMI 35.49 kg/m²     BP Readings from Last 3 Encounters:   10/19/23 136/78   09/01/23 114/76   11/17/22 147/83     Wt Readings from Last 3 Encounters:   10/19/23 103 kg (226 lb 9.6 oz)   09/01/23 103 kg (228 lb)   11/17/22 105 kg (231 lb 6.4 oz)       Physical Exam   Appearance: No acute distress, well-nourished  Head: normocephalic, atraumatic  Eyes: extraocular movements intact, no scleral icterus, no " conjunctival injection  Ears, Nose, and Throat: external ears normal, nares patent, moist mucous membranes  Cardiovascular: regular rate and rhythm. no murmurs, rubs, or gallops. no edema  Respiratory: breathing comfortably, symmetric chest rise, clear to auscultation bilaterally. No wheezes, rales, or rhonchi.  Neuro: alert and oriented to time, place, and person. Normal gait  Psych: normal mood and affect     Result Review :   The following data was reviewed by: Blu Engel Jr, MD on 10/19/2023:  Common labs          10/19/2023    11:42   Common Labs   Glucose 109    BUN 16    Creatinine 1.26    Sodium 138    Potassium 4.3    Chloride 101    Calcium 9.8    Albumin 4.3    Total Bilirubin 0.6    Alkaline Phosphatase 87    AST (SGOT) 29    ALT (SGPT) 34    WBC 7.77    Hemoglobin 15.4    Hematocrit 44.4    Platelets 337    Total Cholesterol 147    Triglycerides 110    HDL Cholesterol 34    LDL Cholesterol  93    Hemoglobin A1C 6.20    Microalbumin, Urine 1.4           Lab Results   Component Value Date    SARSANTIGEN Detected (A) 09/01/2023    RAPFLUA Negative 09/01/2023    RAPFLUB Negative 09/01/2023    FLUAAG Not Detected 09/01/2023    FLUBAG Not Detected 09/01/2023          Assessment and Plan    Diagnoses and all orders for this visit:    1. Localized edema (Primary)  Comments:  uncertain etiology. workup with labs. add lasix to help reduce. monitor.  Orders:  -     proBNP  -     TSH  -     furosemide (Lasix) 40 MG tablet; Take 1 tablet by mouth Daily.  Dispense: 30 tablet; Refill: 0    2. Need for influenza vaccination  -     Fluzone (or Fluarix & Flulaval for VFC) >6mos    3. Impaired glucose tolerance  Comments:  check labs. cont metformin.  Orders:  -     metFORMIN (GLUCOPHAGE) 500 MG tablet; Take 1 tablet by mouth 2 (Two) Times a Day With Meals.  Dispense: 180 tablet; Refill: 3  -     Hemoglobin A1c  -     Lipid Panel  -     Microalbumin / Creatinine Urine Ratio - Urine, Clean Catch    4. Primary  hypertension  Comments:  bordelrine today. will cont monitoring. shoudl reduce with use of lasix.  Orders:  -     CBC & Differential  -     Comprehensive Metabolic Panel    5. Vitamin D deficiency  -     Vitamin D,25-Hydroxy      Medications Discontinued During This Encounter   Medication Reason    metFORMIN (GLUCOPHAGE) 500 MG tablet Reorder        Follow Up   Return for Next scheduled follow up.  Patient was given instructions and counseling regarding his condition or for health maintenance advice. Please see specific information pulled into the AVS if appropriate.       Blu Engel Jr, MD  10/27/23  14:51 EDT

## 2023-12-21 ENCOUNTER — OFFICE VISIT (OUTPATIENT)
Dept: INTERNAL MEDICINE | Facility: CLINIC | Age: 63
End: 2023-12-21
Payer: COMMERCIAL

## 2023-12-21 VITALS
HEART RATE: 70 BPM | BODY MASS INDEX: 35.83 KG/M2 | OXYGEN SATURATION: 95 % | TEMPERATURE: 96.8 F | WEIGHT: 228.3 LBS | SYSTOLIC BLOOD PRESSURE: 142 MMHG | DIASTOLIC BLOOD PRESSURE: 87 MMHG | HEIGHT: 67 IN

## 2023-12-21 DIAGNOSIS — Z00.00 ANNUAL PHYSICAL EXAM: Primary | ICD-10-CM

## 2023-12-21 DIAGNOSIS — M25.562 CHRONIC PAIN OF BOTH KNEES: ICD-10-CM

## 2023-12-21 DIAGNOSIS — R73.02 IMPAIRED GLUCOSE TOLERANCE: ICD-10-CM

## 2023-12-21 DIAGNOSIS — M19.90 ARTHRITIS: ICD-10-CM

## 2023-12-21 DIAGNOSIS — G89.29 CHRONIC PAIN OF BOTH KNEES: ICD-10-CM

## 2023-12-21 DIAGNOSIS — Z29.11 NEED FOR RSV VACCINATION: ICD-10-CM

## 2023-12-21 DIAGNOSIS — M25.561 CHRONIC PAIN OF BOTH KNEES: ICD-10-CM

## 2023-12-21 DIAGNOSIS — R60.0 LOCALIZED EDEMA: ICD-10-CM

## 2023-12-21 DIAGNOSIS — Z12.5 PROSTATE CANCER SCREENING: ICD-10-CM

## 2023-12-21 DIAGNOSIS — I10 PRIMARY HYPERTENSION: ICD-10-CM

## 2023-12-21 RX ORDER — MELOXICAM 15 MG/1
15 TABLET ORAL DAILY PRN
Qty: 90 TABLET | Refills: 1 | Status: SHIPPED | OUTPATIENT
Start: 2023-12-21

## 2023-12-21 RX ORDER — FUROSEMIDE 40 MG/1
40 TABLET ORAL DAILY
Qty: 90 TABLET | Refills: 1 | Status: SHIPPED | OUTPATIENT
Start: 2023-12-21

## 2023-12-21 RX ORDER — SPIRONOLACTONE AND HYDROCHLOROTHIAZIDE 25; 25 MG/1; MG/1
1 TABLET ORAL DAILY
Qty: 90 TABLET | Refills: 1 | Status: SHIPPED | OUTPATIENT
Start: 2023-12-21

## 2023-12-21 NOTE — PROGRESS NOTES
"Chief Complaint  Hypertension and Annual Exam    Subjective          Kit Gama presents to [facility]  History of Present Illness  Hypertension- patient denies headaches, dizziness, chest pain. Patient has been checking Blood Pressure at home weekly with readings typically above goal at 140/90. Patient reports increase stressors at work. Patient notices edema in legs when he doesn't take lasix.   Impaired glucose tolerance- patient denies hypoglycemic events.   Patient reports ongoing grover knee pain. He reports previously considering replacement. He would like to discuss again with orthopedics. Patient reports voltaren does not seem to help for as long as it once did.      Current Outpatient Medications   Medication Instructions    amLODIPine (NORVASC) 10 mg, Oral, Daily    furosemide (LASIX) 40 mg, Oral, Daily    losartan (COZAAR) 100 MG tablet TAKE 1 TABLET BY MOUTH EVERY DAY    magnesium oxide (MAG-OX) 400 mg, Oral, Daily    meloxicam (MOBIC) 15 mg, Oral, Daily PRN    metFORMIN (GLUCOPHAGE) 500 mg, Oral, 2 Times Daily With Meals    spironolactone-hydrochlorothiazide (Aldactazide) 25-25 MG tablet 1 tablet, Oral, Daily    vitamin B-12 (CYANOCOBALAMIN) 1,000 mcg, Oral, Daily    vitamin D (ERGOCALCIFEROL) 1.25 MG (51541 UT) capsule capsule TAKE 1 CAPSULE BY MOUTH 1 TIME PER WEEK.       The following portions of the patient's history were reviewed and updated as appropriate: allergies, current medications, past family history, past medical history, past social history, past surgical history, and problem list.    Objective   Vital Signs:   /87 (BP Location: Left arm)   Pulse 70   Temp 96.8 °F (36 °C) (Temporal)   Ht 170.2 cm (67\")   Wt 104 kg (228 lb 4.8 oz)   SpO2 95%   BMI 35.76 kg/m²     BP Readings from Last 3 Encounters:   12/21/23 142/87   10/19/23 136/78   09/01/23 114/76     Wt Readings from Last 3 Encounters:   12/21/23 104 kg (228 lb 4.8 oz) "   10/19/23 103 kg (226 lb 9.6 oz)   09/01/23 103 kg (228 lb)         Physical Exam   Appearance: No acute distress, well-nourished  Head: normocephalic, atraumatic  Eyes: extraocular movements intact, no scleral icterus, no conjunctival injection  Ears, Nose, and Throat: external ears normal, nares patent, moist mucous membranes  Cardiovascular: regular rate and rhythm. no murmurs, rubs, or gallops. no edema  Respiratory: breathing comfortably, symmetric chest rise, clear to auscultation bilaterally. No wheezes, rales, or rhonchi.  Neuro: alert and oriented to time, place, and person. Normal gait  Psych: normal mood and affect       Result Review :   The following data was reviewed by: Blu Engel Jr, MD on 12/21/2023:  Common labs          10/19/2023    11:42   Common Labs   Glucose 109    BUN 16    Creatinine 1.26    Sodium 138    Potassium 4.3    Chloride 101    Calcium 9.8    Albumin 4.3    Total Bilirubin 0.6    Alkaline Phosphatase 87    AST (SGOT) 29    ALT (SGPT) 34    WBC 7.77    Hemoglobin 15.4    Hematocrit 44.4    Platelets 337    Total Cholesterol 147    Triglycerides 110    HDL Cholesterol 34    LDL Cholesterol  93    Hemoglobin A1C 6.20    Microalbumin, Urine 1.4             Lab Results   Component Value Date    SARSANTIGEN Detected (A) 09/01/2023    RAPFLUA Negative 09/01/2023    RAPFLUB Negative 09/01/2023    FLUAAG Not Detected 09/01/2023    FLUBAG Not Detected 09/01/2023          Assessment and Plan    Diagnoses and all orders for this visit:    1. Annual physical exam (Primary)    2. Primary hypertension  Comments:  monitor with shift in diuretics.  Orders:  -     spironolactone-hydrochlorothiazide (Aldactazide) 25-25 MG tablet; Take 1 tablet by mouth Daily.  Dispense: 90 tablet; Refill: 1  -     CBC & Differential; Future  -     Comprehensive Metabolic Panel; Future    3. Impaired glucose tolerance  Comments:  check labs in 1 month. well controlled  Orders:  -     Hemoglobin A1c;  Future  -     Lipid Panel; Future    4. Localized edema  Comments:  will resume lasix daily and switch to aldactazide for electrolyte balance. monitor efficacy.  Orders:  -     furosemide (Lasix) 40 MG tablet; Take 1 tablet by mouth Daily.  Dispense: 90 tablet; Refill: 1    5. Prostate cancer screening  -     PSA Screen; Future    6. Arthritis  Comments:  stop volatren and start mobic for longer acting NSAID  Orders:  -     meloxicam (MOBIC) 15 MG tablet; Take 1 tablet by mouth Daily As Needed for Moderate Pain.  Dispense: 90 tablet; Refill: 1    7. Need for RSV vaccination    8. Chronic pain of both knees  Comments:  will refer to ortho to discuss management options.  Orders:  -     Ambulatory Referral to Orthopedic Surgery      Advised on diet, physical activity, etc      Medications Discontinued During This Encounter   Medication Reason    furosemide (Lasix) 40 MG tablet Reorder    hydroCHLOROthiazide (HYDRODIURIL) 25 MG tablet Alternate therapy    diclofenac (VOLTAREN) 75 MG EC tablet Alternate therapy          Follow Up   Return in about 5 months (around 5/21/2024) for Recheck, DM, HTN.  Patient was given instructions and counseling regarding his condition or for health maintenance advice. Please see specific information pulled into the AVS if appropriate.       Blu Engel Jr, MD  12/21/23  09:00 EST

## 2024-01-04 ENCOUNTER — OFFICE VISIT (OUTPATIENT)
Dept: ORTHOPEDIC SURGERY | Facility: CLINIC | Age: 64
End: 2024-01-04
Payer: COMMERCIAL

## 2024-01-04 VITALS
SYSTOLIC BLOOD PRESSURE: 149 MMHG | WEIGHT: 225 LBS | OXYGEN SATURATION: 97 % | HEART RATE: 77 BPM | HEIGHT: 67 IN | BODY MASS INDEX: 35.31 KG/M2 | DIASTOLIC BLOOD PRESSURE: 91 MMHG

## 2024-01-04 DIAGNOSIS — M25.561 RIGHT KNEE PAIN, UNSPECIFIED CHRONICITY: Primary | ICD-10-CM

## 2024-01-04 DIAGNOSIS — M25.562 LEFT KNEE PAIN, UNSPECIFIED CHRONICITY: ICD-10-CM

## 2024-01-04 RX ADMIN — TRIAMCINOLONE ACETONIDE 40 MG: 40 INJECTION, SUSPENSION INTRA-ARTICULAR; INTRAMUSCULAR at 16:10

## 2024-01-04 RX ADMIN — LIDOCAINE HYDROCHLORIDE 5 ML: 10 INJECTION, SOLUTION INFILTRATION; PERINEURAL at 16:10

## 2024-01-04 NOTE — PROGRESS NOTES
"Chief Complaint  Pain of the Left Knee and Pain of the Right Knee     Subjective      Kit Gama presents to [facility] for re-evaluation of the bilateral knees. He reports bilateral knee pain for many years. He denies a specific injury. He has had injections in the past.     No Known Allergies     Social History     Socioeconomic History    Marital status:    Tobacco Use    Smoking status: Never    Smokeless tobacco: Never   Vaping Use    Vaping Use: Never used   Substance and Sexual Activity    Alcohol use: Never    Drug use: Never    Sexual activity: Defer        I reviewed the patient's chief complaint, history of present illness, review of systems, past medical history, surgical history, family history, social history, medications, and allergy list.     Review of Systems     Constitutional: Denies fevers, chills, weight loss  Cardiovascular: Denies chest pain, shortness of breath  Skin: Denies rashes, acute skin changes  Neurologic: Denies headache, loss of consciousness  MSK: bilateral knee pain      Vital Signs:   /91   Pulse 77   Ht 170.2 cm (67\")   Wt 102 kg (225 lb)   SpO2 97%   BMI 35.24 kg/m²          Physical Exam  General: Alert. No acute distress    Ortho Exam      Right knee- ROM -5 to 120 degrees. Stable to varus/valgus stress. Stable to anterior/posterior drawer. Positive EHL, FHL, GS and TA. Sensation intact to all 5 nerves of the foot. Positive pulses. Non-tender. No effusion. Positive crepitus. Negative Eduardo's. Negative Lachman's.     Left knee- ROM -5 to 120 degrees. Stable to varus/valgus stress. Stable to anterior/posterior drawer. Positive EHL, FHL, GS and TA. Sensation intact to all 5 nerves of the foot. Positive pulses. Non-tender. No effusion. Positive crepitus. Negative Eduardo's. Negative Lachman's.     Right knee: R knee  Date/Time: 1/4/2024 4:10 PM  Consent given by: patient  Site marked: site marked  Timeout: Immediately " prior to procedure a time out was called to verify the correct patient, procedure, equipment, support staff and site/side marked as required   Supporting Documentation  Indications: pain   Procedure Details  Location: knee - R knee  Needle gauge: 21G.  Medications administered: 5 mL lidocaine 1 %; 40 mg triamcinolone acetonide 40 MG/ML  Patient tolerance: patient tolerated the procedure well with no immediate complications      Left knee: L knee  Date/Time: 1/4/2024 4:10 PM  Consent given by: patient  Site marked: site marked  Timeout: Immediately prior to procedure a time out was called to verify the correct patient, procedure, equipment, support staff and site/side marked as required   Supporting Documentation  Indications: pain   Procedure Details  Location: knee - L knee  Needle gauge: 21G.  Medications administered: 5 mL lidocaine 1 %; 40 mg triamcinolone acetonide 40 MG/ML  Patient tolerance: patient tolerated the procedure well with no immediate complications          X-Ray Report:  Left knee X-Ray  Indication: Evaluation of left knee pain  AP/Lateral, Standing, and [place] view(s)  Findings: medial joint space narrowing. Moderate advanced degenerative changes. No acute fracture.   Prior studies available for comparison: yes     X-Ray Report:  Right knee X-Ray  Indication: Evaluation of Right knee pain  AP/Lateral, Standing, and [place] view(s)  Findings: advanced degenerative changes, bone on bone to the medial compartment. Patellofemoral osteophytes. No acute fracture or effusion  Prior studies available for comparison: yes       Imaging Results (Most Recent)       Procedure Component Value Units Date/Time    XR Knee 3 View Right [[id number]] Resulted: 01/04/24 1523     Updated: 01/04/24 1531    XR Knee 3 View Left [[id number]] Resulted: 01/04/24 1523     Updated: 01/04/24 1531             Result Review :       No results found.           Assessment and Plan     Diagnoses and all orders for this visit:    1.  Right knee pain, unspecified chronicity (Primary)  -     XR Knee 3 View Right    2. Left knee pain, unspecified chronicity  -     XR Knee 3 View Left        Discussed the treatment plan with the patient.  I reviewed the x-rays that were obtained today with the patient. Plan for conservative treatment at this time. Plan to continue mobic. Discussed the risks and benefits of bilateral knee steroid injections. The patient expressed understanding and wished to proceed. He tolerated the injections well. Home exercises given today.     Call or return if worsening symptoms.    Follow Up     6 weeks      Patient was given instructions and counseling regarding his condition or for health maintenance advice. Please see specific information pulled into the AVS if appropriate.     Scribed for Hemant Singer MD by Grisel Bustillo.  01/04/24   15:22 EST    I have personally performed the services described in this document as scribed by the above individual and it is both accurate and complete. Hemant Singer MD 01/06/24

## 2024-01-06 RX ORDER — LIDOCAINE HYDROCHLORIDE 10 MG/ML
5 INJECTION, SOLUTION INFILTRATION; PERINEURAL
Status: COMPLETED | OUTPATIENT
Start: 2024-01-04 | End: 2024-01-04

## 2024-01-06 RX ORDER — TRIAMCINOLONE ACETONIDE 40 MG/ML
40 INJECTION, SUSPENSION INTRA-ARTICULAR; INTRAMUSCULAR
Status: COMPLETED | OUTPATIENT
Start: 2024-01-04 | End: 2024-01-04

## 2024-04-11 ENCOUNTER — OFFICE VISIT (OUTPATIENT)
Dept: ORTHOPEDIC SURGERY | Facility: CLINIC | Age: 64
End: 2024-04-11
Payer: COMMERCIAL

## 2024-04-11 VITALS
SYSTOLIC BLOOD PRESSURE: 125 MMHG | HEART RATE: 68 BPM | WEIGHT: 223 LBS | HEIGHT: 67 IN | DIASTOLIC BLOOD PRESSURE: 83 MMHG | OXYGEN SATURATION: 96 % | BODY MASS INDEX: 35 KG/M2

## 2024-04-11 DIAGNOSIS — M17.0 BILATERAL PRIMARY OSTEOARTHRITIS OF KNEE: Primary | ICD-10-CM

## 2024-04-11 RX ORDER — TRIAMCINOLONE ACETONIDE 40 MG/ML
40 INJECTION, SUSPENSION INTRA-ARTICULAR; INTRAMUSCULAR
Status: COMPLETED | OUTPATIENT
Start: 2024-04-11 | End: 2024-04-11

## 2024-04-11 RX ORDER — LIDOCAINE HYDROCHLORIDE 10 MG/ML
5 INJECTION, SOLUTION INFILTRATION; PERINEURAL
Status: COMPLETED | OUTPATIENT
Start: 2024-04-11 | End: 2024-04-11

## 2024-04-11 RX ADMIN — TRIAMCINOLONE ACETONIDE 40 MG: 40 INJECTION, SUSPENSION INTRA-ARTICULAR; INTRAMUSCULAR at 08:29

## 2024-04-11 RX ADMIN — LIDOCAINE HYDROCHLORIDE 5 ML: 10 INJECTION, SOLUTION INFILTRATION; PERINEURAL at 08:29

## 2024-04-11 NOTE — PROGRESS NOTES
"Chief Complaint  Follow-up of the Right Knee and Follow-up of the Left Knee     Subjective      Kit Gama presents to [facility] for follow up evaluation of the bilateral knees. The patient has been treating his bilateral knee osteoarthritis conservatively. He reports relief with the previous steroid and Zilretta injections. He reports his right knee is worse than the left knee.       No Known Allergies     Social History     Socioeconomic History    Marital status:    Tobacco Use    Smoking status: Never    Smokeless tobacco: Never   Vaping Use    Vaping status: Never Used   Substance and Sexual Activity    Alcohol use: Never    Drug use: Never    Sexual activity: Defer        I reviewed the patient's chief complaint, history of present illness, review of systems, past medical history, surgical history, family history, social history, medications, and allergy list.     Review of Systems     Constitutional: Denies fevers, chills, weight loss  Cardiovascular: Denies chest pain, shortness of breath  Skin: Denies rashes, acute skin changes  Neurologic: Denies headache, loss of consciousness  MSK: bilateral knee pain      Vital Signs:   /83   Pulse 68   Ht 170.2 cm (67\")   Wt 101 kg (223 lb)   SpO2 96%   BMI 34.93 kg/m²          Physical Exam  General: Alert. No acute distress    Ortho Exam      Right knee- ROM -5 to 120 degrees. Stable to varus/valgus stress. Stable to anterior/posterior drawer. Positive EHL, FHL, GS and TA. Sensation intact to all 5 nerves of the foot. Positive pulses. Non-tender. No effusion. Positive crepitus. Negative Eduardo's. Negative Lachman's.      Left knee- ROM -5 to 120 degrees. Stable to varus/valgus stress. Stable to anterior/posterior drawer. Positive EHL, FHL, GS and TA. Sensation intact to all 5 nerves of the foot. Positive pulses. Non-tender. No effusion. Positive crepitus. Negative Eduardo's. Negative Lachman's.    Right knee: " R knee  Date/Time: 4/11/2024 8:29 AM  Consent given by: patient  Site marked: site marked  Timeout: Immediately prior to procedure a time out was called to verify the correct patient, procedure, equipment, support staff and site/side marked as required   Supporting Documentation  Indications: pain   Procedure Details  Location: knee - R knee  Needle gauge: 21G.  Medications administered: 5 mL lidocaine 1 %; 40 mg triamcinolone acetonide 40 MG/ML  Patient tolerance: patient tolerated the procedure well with no immediate complications      Left knee: L knee  Date/Time: 4/11/2024 8:29 AM  Consent given by: patient  Site marked: site marked  Timeout: Immediately prior to procedure a time out was called to verify the correct patient, procedure, equipment, support staff and site/side marked as required   Supporting Documentation  Indications: pain   Procedure Details  Location: knee - L knee  Needle gauge: 21G.  Medications administered: 5 mL lidocaine 1 %; 40 mg triamcinolone acetonide 40 MG/ML  Patient tolerance: patient tolerated the procedure well with no immediate complications      This injection documentation was Scribed for Hemant Singer MD by Radha Borja.  04/11/24   08:30 EDT        Imaging Results (Most Recent)       None             Result Review :       No results found.           Assessment and Plan     Diagnoses and all orders for this visit:    1. Bilateral primary osteoarthritis of knee (Primary)        Discussed the treatment plan with the patient.  Discussed the risks and benefits of bilateral knee steroid injections. The patient expressed understanding and wished to proceed. He tolerated the injections well. Plan to continue home exercises and medication. Plan to seek approval for Visco/ Zilretta injections    Call or return if worsening symptoms.    Follow Up     PRN      Patient was given instructions and counseling regarding his condition or for health maintenance advice. Please see specific  information pulled into the AVS if appropriate.     Scribed for Hemant Singer MD by Grisel Bustillo.  04/11/24   08:05 EDT    I have personally performed the services described in this document as scribed by the above individual and it is both accurate and complete. Hemant Singer MD 04/11/24

## 2024-04-30 ENCOUNTER — LAB (OUTPATIENT)
Dept: LAB | Facility: HOSPITAL | Age: 64
End: 2024-04-30
Payer: COMMERCIAL

## 2024-04-30 DIAGNOSIS — R73.02 IMPAIRED GLUCOSE TOLERANCE: ICD-10-CM

## 2024-04-30 DIAGNOSIS — I10 PRIMARY HYPERTENSION: ICD-10-CM

## 2024-04-30 DIAGNOSIS — Z12.5 PROSTATE CANCER SCREENING: ICD-10-CM

## 2024-04-30 LAB
ALBUMIN SERPL-MCNC: 4.2 G/DL (ref 3.5–5.2)
ALBUMIN/GLOB SERPL: 1.4 G/DL
ALP SERPL-CCNC: 90 U/L (ref 39–117)
ALT SERPL W P-5'-P-CCNC: 21 U/L (ref 1–41)
ANION GAP SERPL CALCULATED.3IONS-SCNC: 10.5 MMOL/L (ref 5–15)
AST SERPL-CCNC: 19 U/L (ref 1–40)
BASOPHILS # BLD AUTO: 0.07 10*3/MM3 (ref 0–0.2)
BASOPHILS NFR BLD AUTO: 0.7 % (ref 0–1.5)
BILIRUB SERPL-MCNC: 0.4 MG/DL (ref 0–1.2)
BUN SERPL-MCNC: 15 MG/DL (ref 8–23)
BUN/CREAT SERPL: 10.9 (ref 7–25)
CALCIUM SPEC-SCNC: 10 MG/DL (ref 8.6–10.5)
CHLORIDE SERPL-SCNC: 100 MMOL/L (ref 98–107)
CHOLEST SERPL-MCNC: 157 MG/DL (ref 0–200)
CO2 SERPL-SCNC: 29.5 MMOL/L (ref 22–29)
CREAT SERPL-MCNC: 1.37 MG/DL (ref 0.76–1.27)
DEPRECATED RDW RBC AUTO: 44.4 FL (ref 37–54)
EGFRCR SERPLBLD CKD-EPI 2021: 58.3 ML/MIN/1.73
EOSINOPHIL # BLD AUTO: 0.42 10*3/MM3 (ref 0–0.4)
EOSINOPHIL NFR BLD AUTO: 4.4 % (ref 0.3–6.2)
ERYTHROCYTE [DISTWIDTH] IN BLOOD BY AUTOMATED COUNT: 13.2 % (ref 12.3–15.4)
GLOBULIN UR ELPH-MCNC: 2.9 GM/DL
GLUCOSE SERPL-MCNC: 118 MG/DL (ref 65–99)
HBA1C MFR BLD: 6.5 % (ref 4.8–5.6)
HCT VFR BLD AUTO: 46.4 % (ref 37.5–51)
HDLC SERPL-MCNC: 38 MG/DL (ref 40–60)
HGB BLD-MCNC: 15.1 G/DL (ref 13–17.7)
IMM GRANULOCYTES # BLD AUTO: 0.04 10*3/MM3 (ref 0–0.05)
IMM GRANULOCYTES NFR BLD AUTO: 0.4 % (ref 0–0.5)
LDLC SERPL CALC-MCNC: 100 MG/DL (ref 0–100)
LDLC/HDLC SERPL: 2.58 {RATIO}
LYMPHOCYTES # BLD AUTO: 3.55 10*3/MM3 (ref 0.7–3.1)
LYMPHOCYTES NFR BLD AUTO: 37.4 % (ref 19.6–45.3)
MCH RBC QN AUTO: 29.5 PG (ref 26.6–33)
MCHC RBC AUTO-ENTMCNC: 32.5 G/DL (ref 31.5–35.7)
MCV RBC AUTO: 90.8 FL (ref 79–97)
MONOCYTES # BLD AUTO: 0.77 10*3/MM3 (ref 0.1–0.9)
MONOCYTES NFR BLD AUTO: 8.1 % (ref 5–12)
NEUTROPHILS NFR BLD AUTO: 4.64 10*3/MM3 (ref 1.7–7)
NEUTROPHILS NFR BLD AUTO: 49 % (ref 42.7–76)
NRBC BLD AUTO-RTO: 0 /100 WBC (ref 0–0.2)
PLATELET # BLD AUTO: 308 10*3/MM3 (ref 140–450)
PMV BLD AUTO: 10.5 FL (ref 6–12)
POTASSIUM SERPL-SCNC: 4.5 MMOL/L (ref 3.5–5.2)
PROT SERPL-MCNC: 7.1 G/DL (ref 6–8.5)
PSA SERPL-MCNC: 0.4 NG/ML (ref 0–4)
RBC # BLD AUTO: 5.11 10*6/MM3 (ref 4.14–5.8)
SODIUM SERPL-SCNC: 140 MMOL/L (ref 136–145)
TRIGL SERPL-MCNC: 105 MG/DL (ref 0–150)
VLDLC SERPL-MCNC: 19 MG/DL (ref 5–40)
WBC NRBC COR # BLD AUTO: 9.49 10*3/MM3 (ref 3.4–10.8)

## 2024-04-30 PROCEDURE — 80053 COMPREHEN METABOLIC PANEL: CPT

## 2024-04-30 PROCEDURE — 83036 HEMOGLOBIN GLYCOSYLATED A1C: CPT

## 2024-04-30 PROCEDURE — 36415 COLL VENOUS BLD VENIPUNCTURE: CPT

## 2024-04-30 PROCEDURE — 85025 COMPLETE CBC W/AUTO DIFF WBC: CPT

## 2024-04-30 PROCEDURE — 80061 LIPID PANEL: CPT

## 2024-04-30 PROCEDURE — G0103 PSA SCREENING: HCPCS

## 2024-05-01 ENCOUNTER — TELEPHONE (OUTPATIENT)
Dept: ORTHOPEDIC SURGERY | Facility: CLINIC | Age: 64
End: 2024-05-01
Payer: COMMERCIAL

## 2024-05-01 NOTE — TELEPHONE ENCOUNTER
----- Message from Franca LEE sent at 5/1/2024  8:28 AM EDT -----  Clarisse, Denied bilateral zilretta injections. Will submit for Euflexxa to see if they will approve.

## 2024-05-01 NOTE — TELEPHONE ENCOUNTER
SENT MSG THRU MY CHART:  5-1-24    PER YOUR INSURANCE, ZILRETTA AND EUFLEXXA INJECTIONS WERE BOTH DENIED BECAUSE IT IS CONSIDERED NOT MEDICALLY NECESSARY.    PLEASE CALL THE OFFICE  [phone] TO SCHEDULE AN APPOINTMENT IF YOU WISH TO DISCUSS OTHER OPTIONS.  THANKS

## 2024-05-18 DIAGNOSIS — I10 PRIMARY HYPERTENSION: ICD-10-CM

## 2024-05-18 DIAGNOSIS — R60.0 LOCALIZED EDEMA: ICD-10-CM

## 2024-05-18 DIAGNOSIS — M19.90 ARTHRITIS: ICD-10-CM

## 2024-05-20 RX ORDER — MELOXICAM 15 MG/1
15 TABLET ORAL DAILY PRN
Qty: 90 TABLET | Refills: 1 | Status: SHIPPED | OUTPATIENT
Start: 2024-05-20

## 2024-05-20 RX ORDER — SPIRONOLACTONE AND HYDROCHLOROTHIAZIDE 25; 25 MG/1; MG/1
1 TABLET ORAL DAILY
Qty: 90 TABLET | Refills: 1 | Status: SHIPPED | OUTPATIENT
Start: 2024-05-20

## 2024-05-20 RX ORDER — FUROSEMIDE 40 MG/1
40 TABLET ORAL DAILY
Qty: 90 TABLET | Refills: 1 | Status: SHIPPED | OUTPATIENT
Start: 2024-05-20

## 2024-07-16 NOTE — PROGRESS NOTES
"Chief Complaint  Leg Swelling and Rash (Lower extremity swelling, rash bilateral lower extermities)    Subjective          Kit Gama presents to [facility]  History of Present Illness    LE has been worsening in spite of lasix once daily.  Doing well at clinic today, but worsens on days at work when standing long periods.  It is not painful.  He denies chest pain.  He denies dyspnea and orthopnea.      Current Outpatient Medications   Medication Instructions    amLODIPine (NORVASC) 10 mg, Oral, Daily    furosemide (LASIX) 40 mg, Oral, 2 Times Daily    losartan (COZAAR) 100 MG tablet TAKE 1 TABLET BY MOUTH EVERY DAY    magnesium oxide (MAG-OX) 400 mg, Oral, Daily    meloxicam (MOBIC) 15 mg, Oral, Daily PRN    metFORMIN (GLUCOPHAGE) 500 mg, Oral, 2 Times Daily With Meals    spironolactone-hydrochlorothiazide (ALDACTAZIDE) 25-25 MG tablet 1 tablet, Oral, Daily    vitamin B-12 (CYANOCOBALAMIN) 1,000 mcg, Oral, Daily    vitamin D (ERGOCALCIFEROL) 1.25 MG (71506 UT) capsule capsule TAKE 1 CAPSULE BY MOUTH 1 TIME PER WEEK.       The following portions of the patient's history were reviewed and updated as appropriate: allergies, current medications, past family history, past medical history, past social history, past surgical history, and problem list.    Objective   Vital Signs:   /88   Pulse 75   Temp 98.4 °F (36.9 °C)   Ht 170.2 cm (67\")   Wt 104 kg (228 lb 3.2 oz)   SpO2 98% Comment: room air  BMI 35.74 kg/m²     BP Readings from Last 3 Encounters:   07/19/24 135/88   04/11/24 125/83   01/04/24 149/91     Wt Readings from Last 3 Encounters:   07/19/24 104 kg (228 lb 3.2 oz)   04/11/24 101 kg (223 lb)   01/04/24 102 kg (225 lb)           Physical Exam  Vitals reviewed.   Constitutional:       General: He is not in acute distress.     Appearance: Normal appearance. He is not ill-appearing, toxic-appearing or diaphoretic.   HENT:      Head: Normocephalic " and atraumatic.      Right Ear: External ear normal.      Left Ear: External ear normal.   Eyes:      Conjunctiva/sclera: Conjunctivae normal.   Cardiovascular:      Rate and Rhythm: Normal rate and regular rhythm.      Pulses: Normal pulses.      Heart sounds: Normal heart sounds. No murmur heard.     No friction rub. No gallop.   Pulmonary:      Effort: Pulmonary effort is normal. No respiratory distress.      Breath sounds: Normal breath sounds. No stridor. No wheezing, rhonchi or rales.   Chest:      Chest wall: No tenderness.   Abdominal:      General: Abdomen is flat.      Palpations: Abdomen is soft. There is no mass.      Tenderness: There is no abdominal tenderness.   Musculoskeletal:      Right lower leg: Edema present.      Left lower leg: Edema present.      Comments: 1+ LE edema bilaterally   Skin:     General: Skin is warm and dry.   Neurological:      General: No focal deficit present.      Mental Status: He is alert. Mental status is at baseline.   Psychiatric:         Behavior: Behavior normal.         Thought Content: Thought content normal.         Judgment: Judgment normal.         Result Review :   The following data was reviewed by: Derrick Mckeon MD on 07/19/2024:  Common labs          10/19/2023    11:42 4/30/2024    09:10   Common Labs   Glucose 109  118    BUN 16  15    Creatinine 1.26  1.37    Sodium 138  140    Potassium 4.3  4.5    Chloride 101  100    Calcium 9.8  10.0    Albumin 4.3  4.2    Total Bilirubin 0.6  0.4    Alkaline Phosphatase 87  90    AST (SGOT) 29  19    ALT (SGPT) 34  21    WBC 7.77  9.49    Hemoglobin 15.4  15.1    Hematocrit 44.4  46.4    Platelets 337  308    Total Cholesterol 147  157    Triglycerides 110  105    HDL Cholesterol 34  38    LDL Cholesterol  93  100    Hemoglobin A1C 6.20  6.50    Microalbumin, Urine 1.4     PSA  0.403             Lab Results   Component Value Date    SARSANTIGEN Detected (A) 09/01/2023    RAPFLUA Negative 09/01/2023    RAPFLUB  Negative 09/01/2023    FLUAAG Not Detected 09/01/2023    FLUBAG Not Detected 09/01/2023            Assessment and Plan    Diagnoses and all orders for this visit:    1. Localized swelling of both lower extremities (Primary)  -     furosemide (LASIX) 40 MG tablet; Take 1 tablet by mouth 2 (Two) Times a Day.  Dispense: 180 tablet; Refill: 1    2. Primary hypertension      LE Edema:  -will increase lasix to BID    HTN:  -stable    Medications Discontinued During This Encounter   Medication Reason    furosemide (LASIX) 40 MG tablet Reorder          Follow Up   Return in about 3 months (around 10/19/2024) for Hypertension.  Patient was given instructions and counseling regarding his condition or for health maintenance advice. Please see specific information pulled into the AVS if appropriate.       Derrick Mckeon MD  07/19/24  17:53 EDT

## 2024-07-19 ENCOUNTER — OFFICE VISIT (OUTPATIENT)
Dept: INTERNAL MEDICINE | Facility: CLINIC | Age: 64
End: 2024-07-19
Payer: COMMERCIAL

## 2024-07-19 VITALS
OXYGEN SATURATION: 98 % | BODY MASS INDEX: 35.82 KG/M2 | DIASTOLIC BLOOD PRESSURE: 88 MMHG | HEART RATE: 75 BPM | SYSTOLIC BLOOD PRESSURE: 135 MMHG | HEIGHT: 67 IN | WEIGHT: 228.2 LBS | TEMPERATURE: 98.4 F

## 2024-07-19 DIAGNOSIS — M79.89 LOCALIZED SWELLING OF BOTH LOWER EXTREMITIES: Primary | ICD-10-CM

## 2024-07-19 DIAGNOSIS — I10 PRIMARY HYPERTENSION: ICD-10-CM

## 2024-07-19 PROCEDURE — 99214 OFFICE O/P EST MOD 30 MIN: CPT | Performed by: STUDENT IN AN ORGANIZED HEALTH CARE EDUCATION/TRAINING PROGRAM

## 2024-07-19 RX ORDER — FUROSEMIDE 40 MG/1
40 TABLET ORAL 2 TIMES DAILY
Qty: 180 TABLET | Refills: 1 | Status: SHIPPED | OUTPATIENT
Start: 2024-07-19

## 2024-07-22 ENCOUNTER — TELEPHONE (OUTPATIENT)
Dept: INTERNAL MEDICINE | Facility: CLINIC | Age: 64
End: 2024-07-22
Payer: COMMERCIAL

## 2024-09-16 DIAGNOSIS — I10 PRIMARY HYPERTENSION: ICD-10-CM

## 2024-09-16 RX ORDER — AMLODIPINE BESYLATE 10 MG/1
10 TABLET ORAL DAILY
Qty: 90 TABLET | Refills: 3 | Status: SHIPPED | OUTPATIENT
Start: 2024-09-16

## 2024-11-11 ENCOUNTER — OFFICE VISIT (OUTPATIENT)
Dept: INTERNAL MEDICINE | Facility: CLINIC | Age: 64
End: 2024-11-11
Payer: COMMERCIAL

## 2024-11-11 ENCOUNTER — LAB (OUTPATIENT)
Dept: LAB | Facility: HOSPITAL | Age: 64
End: 2024-11-11
Payer: COMMERCIAL

## 2024-11-11 VITALS
TEMPERATURE: 97.1 F | OXYGEN SATURATION: 96 % | WEIGHT: 228.4 LBS | SYSTOLIC BLOOD PRESSURE: 155 MMHG | HEART RATE: 81 BPM | HEIGHT: 67 IN | BODY MASS INDEX: 35.85 KG/M2 | DIASTOLIC BLOOD PRESSURE: 78 MMHG

## 2024-11-11 DIAGNOSIS — I10 PRIMARY HYPERTENSION: ICD-10-CM

## 2024-11-11 DIAGNOSIS — E11.65 TYPE 2 DIABETES MELLITUS WITH HYPERGLYCEMIA, WITHOUT LONG-TERM CURRENT USE OF INSULIN: ICD-10-CM

## 2024-11-11 DIAGNOSIS — I10 PRIMARY HYPERTENSION: Primary | ICD-10-CM

## 2024-11-11 DIAGNOSIS — Z23 NEED FOR INFLUENZA VACCINATION: ICD-10-CM

## 2024-11-11 LAB
ALBUMIN SERPL-MCNC: 4 G/DL (ref 3.5–5.2)
ALBUMIN UR-MCNC: 2 MG/DL
ALBUMIN/GLOB SERPL: 1.1 G/DL
ALP SERPL-CCNC: 102 U/L (ref 39–117)
ALT SERPL W P-5'-P-CCNC: 17 U/L (ref 1–41)
ANION GAP SERPL CALCULATED.3IONS-SCNC: 11.6 MMOL/L (ref 5–15)
AST SERPL-CCNC: 12 U/L (ref 1–40)
BASOPHILS # BLD AUTO: 0.09 10*3/MM3 (ref 0–0.2)
BASOPHILS NFR BLD AUTO: 0.9 % (ref 0–1.5)
BILIRUB SERPL-MCNC: 0.3 MG/DL (ref 0–1.2)
BUN SERPL-MCNC: 13 MG/DL (ref 8–23)
BUN/CREAT SERPL: 9.9 (ref 7–25)
CALCIUM SPEC-SCNC: 9.5 MG/DL (ref 8.6–10.5)
CHLORIDE SERPL-SCNC: 102 MMOL/L (ref 98–107)
CHOLEST SERPL-MCNC: 155 MG/DL (ref 0–200)
CO2 SERPL-SCNC: 26.4 MMOL/L (ref 22–29)
CREAT SERPL-MCNC: 1.31 MG/DL (ref 0.76–1.27)
CREAT UR-MCNC: 287.6 MG/DL
DEPRECATED RDW RBC AUTO: 37.9 FL (ref 37–54)
EGFRCR SERPLBLD CKD-EPI 2021: 61.5 ML/MIN/1.73
EOSINOPHIL # BLD AUTO: 0.53 10*3/MM3 (ref 0–0.4)
EOSINOPHIL NFR BLD AUTO: 5.4 % (ref 0.3–6.2)
ERYTHROCYTE [DISTWIDTH] IN BLOOD BY AUTOMATED COUNT: 12 % (ref 12.3–15.4)
GLOBULIN UR ELPH-MCNC: 3.6 GM/DL
GLUCOSE SERPL-MCNC: 132 MG/DL (ref 65–99)
HBA1C MFR BLD: 6.4 % (ref 4.8–5.6)
HCT VFR BLD AUTO: 48.2 % (ref 37.5–51)
HDLC SERPL-MCNC: 28 MG/DL (ref 40–60)
HGB BLD-MCNC: 16.4 G/DL (ref 13–17.7)
IMM GRANULOCYTES # BLD AUTO: 0.02 10*3/MM3 (ref 0–0.05)
IMM GRANULOCYTES NFR BLD AUTO: 0.2 % (ref 0–0.5)
LDLC SERPL CALC-MCNC: 90 MG/DL (ref 0–100)
LDLC/HDLC SERPL: 2.99 {RATIO}
LYMPHOCYTES # BLD AUTO: 2.58 10*3/MM3 (ref 0.7–3.1)
LYMPHOCYTES NFR BLD AUTO: 26.3 % (ref 19.6–45.3)
MCH RBC QN AUTO: 29.6 PG (ref 26.6–33)
MCHC RBC AUTO-ENTMCNC: 34 G/DL (ref 31.5–35.7)
MCV RBC AUTO: 87 FL (ref 79–97)
MICROALBUMIN/CREAT UR: 7 MG/G (ref 0–29)
MONOCYTES # BLD AUTO: 1.01 10*3/MM3 (ref 0.1–0.9)
MONOCYTES NFR BLD AUTO: 10.3 % (ref 5–12)
NEUTROPHILS NFR BLD AUTO: 5.59 10*3/MM3 (ref 1.7–7)
NEUTROPHILS NFR BLD AUTO: 56.9 % (ref 42.7–76)
NRBC BLD AUTO-RTO: 0 /100 WBC (ref 0–0.2)
PLATELET # BLD AUTO: 359 10*3/MM3 (ref 140–450)
PMV BLD AUTO: 10.1 FL (ref 6–12)
POTASSIUM SERPL-SCNC: 3.7 MMOL/L (ref 3.5–5.2)
PROT SERPL-MCNC: 7.6 G/DL (ref 6–8.5)
RBC # BLD AUTO: 5.54 10*6/MM3 (ref 4.14–5.8)
SODIUM SERPL-SCNC: 140 MMOL/L (ref 136–145)
TRIGL SERPL-MCNC: 217 MG/DL (ref 0–150)
VLDLC SERPL-MCNC: 37 MG/DL (ref 5–40)
WBC NRBC COR # BLD AUTO: 9.82 10*3/MM3 (ref 3.4–10.8)

## 2024-11-11 PROCEDURE — 80053 COMPREHEN METABOLIC PANEL: CPT

## 2024-11-11 PROCEDURE — 82570 ASSAY OF URINE CREATININE: CPT

## 2024-11-11 PROCEDURE — 82043 UR ALBUMIN QUANTITATIVE: CPT

## 2024-11-11 PROCEDURE — 90471 IMMUNIZATION ADMIN: CPT | Performed by: INTERNAL MEDICINE

## 2024-11-11 PROCEDURE — 90656 IIV3 VACC NO PRSV 0.5 ML IM: CPT | Performed by: INTERNAL MEDICINE

## 2024-11-11 PROCEDURE — 36415 COLL VENOUS BLD VENIPUNCTURE: CPT

## 2024-11-11 PROCEDURE — 85025 COMPLETE CBC W/AUTO DIFF WBC: CPT

## 2024-11-11 PROCEDURE — 80061 LIPID PANEL: CPT

## 2024-11-11 PROCEDURE — 83036 HEMOGLOBIN GLYCOSYLATED A1C: CPT

## 2024-11-11 PROCEDURE — 99214 OFFICE O/P EST MOD 30 MIN: CPT | Performed by: INTERNAL MEDICINE

## 2024-11-11 RX ORDER — LOSARTAN POTASSIUM 100 MG/1
100 TABLET ORAL DAILY
Qty: 90 TABLET | Refills: 3 | Status: SHIPPED | OUTPATIENT
Start: 2024-11-11

## 2024-11-11 RX ORDER — ERGOCALCIFEROL 1.25 MG/1
50000 CAPSULE, LIQUID FILLED ORAL
Qty: 13 CAPSULE | Refills: 3 | Status: SHIPPED | OUTPATIENT
Start: 2024-11-11

## 2024-11-11 RX ORDER — CLINDAMYCIN HYDROCHLORIDE 300 MG/1
300 CAPSULE ORAL 3 TIMES DAILY
COMMUNITY
Start: 2024-11-09 | End: 2024-11-30

## 2024-11-11 RX ORDER — LANOLIN ALCOHOL/MO/W.PET/CERES
1000 CREAM (GRAM) TOPICAL DAILY
Qty: 90 TABLET | Refills: 3 | Status: SHIPPED | OUTPATIENT
Start: 2024-11-11

## 2024-11-11 RX ORDER — SPIRONOLACTONE AND HYDROCHLOROTHIAZIDE 50; 50 MG/1; MG/1
1 TABLET, FILM COATED ORAL DAILY
Qty: 30 TABLET | Refills: 11 | Status: SHIPPED | OUTPATIENT
Start: 2024-11-11 | End: 2025-11-11

## 2024-11-11 RX ORDER — SPIRONOLACTONE AND HYDROCHLOROTHIAZIDE 25; 25 MG/1; MG/1
1 TABLET ORAL DAILY
Qty: 90 TABLET | Refills: 1 | Status: SHIPPED | OUTPATIENT
Start: 2024-11-11 | End: 2024-11-11 | Stop reason: ALTCHOICE

## 2024-11-11 NOTE — PROGRESS NOTES
"Chief Complaint  dental clearance    Subjective          Kit Gama presents to [facility]  History of Present Illness  Patient is scheduled for dental procedure/extraction within 1 week. Patient reports having dental infection in right lower jaw. Patient is on antibiotics for infection. He is on tylenol for the pain. Patient reports cancelled procedure same day due to Blood Pressure. Patient reports Blood Pressure has been elevated recently, and he thinks due to pain.     Current Outpatient Medications   Medication Instructions    amLODIPine (NORVASC) 10 mg, Oral, Daily    clindamycin (CLEOCIN) 300 mg, 3 Times Daily    empagliflozin (JARDIANCE) 25 mg, Oral, Daily    furosemide (LASIX) 40 mg, Oral, 2 Times Daily    losartan (COZAAR) 100 mg, Oral, Daily    magnesium oxide (MAG-OX) 400 mg, Oral, Daily    meloxicam (MOBIC) 15 mg, Oral, Daily PRN    metFORMIN (GLUCOPHAGE) 500 mg, Oral, 2 Times Daily With Meals    spironolactone-hydrochlorothiazide (Aldactazide) 50-50 MG per tablet 1 tablet, Oral, Daily    vitamin B-12 (CYANOCOBALAMIN) 1,000 mcg, Oral, Daily    vitamin D (ERGOCALCIFEROL) 50,000 Units, Oral, Every 7 Days       The following portions of the patient's history were reviewed and updated as appropriate: allergies, current medications, past family history, past medical history, past social history, past surgical history, and problem list.    Objective   Vital Signs:   /78 (BP Location: Left arm, Patient Position: Sitting, Cuff Size: Adult)   Pulse 81   Temp 97.1 °F (36.2 °C) (Temporal)   Ht 170.2 cm (67\")   Wt 104 kg (228 lb 6.4 oz)   SpO2 96%   BMI 35.77 kg/m²     BP Readings from Last 3 Encounters:   11/11/24 155/78   07/19/24 135/88   04/11/24 125/83     Wt Readings from Last 3 Encounters:   11/11/24 104 kg (228 lb 6.4 oz)   07/19/24 104 kg (228 lb 3.2 oz)   04/11/24 101 kg (223 lb)        Physical Exam   Appearance: No acute distress, " well-nourished  Head: normocephalic, atraumatic  Eyes: extraocular movements intact, no scleral icterus, no conjunctival injection  Ears, Nose, and Throat: external ears normal, nares patent, moist mucous membranes  Cardiovascular: regular rate and rhythm. no murmurs, rubs, or gallops. no edema  Respiratory: breathing comfortably, symmetric chest rise, clear to auscultation bilaterally. No wheezes, rales, or rhonchi.  Neuro: alert and oriented to time, place, and person. Normal gait  Psych: normal mood and affect     Result Review :   The following data was reviewed by: Blu Engel Jr, MD on 11/11/2024:  Common labs          4/30/2024    09:10   Common Labs   Glucose 118    BUN 15    Creatinine 1.37    Sodium 140    Potassium 4.5    Chloride 100    Calcium 10.0    Albumin 4.2    Total Bilirubin 0.4    Alkaline Phosphatase 90    AST (SGOT) 19    ALT (SGPT) 21    WBC 9.49    Hemoglobin 15.1    Hematocrit 46.4    Platelets 308    Total Cholesterol 157    Triglycerides 105    HDL Cholesterol 38    LDL Cholesterol  100    Hemoglobin A1C 6.50    PSA 0.403        Lab Results   Component Value Date    SARSANTIGEN Detected (A) 09/01/2023    RAPFLUA Negative 09/01/2023    RAPFLUB Negative 09/01/2023    FLUAAG Not Detected 09/01/2023    FLUBAG Not Detected 09/01/2023          Assessment and Plan    Diagnoses and all orders for this visit:    1. Primary hypertension (Primary)  Comments:  inc aldactazide to 50/50mg daily. check labs in 3-4 days.  Orders:  -     losartan (COZAAR) 100 MG tablet; Take 1 tablet by mouth Daily.  Dispense: 90 tablet; Refill: 3  -     spironolactone-hydrochlorothiazide (Aldactazide) 50-50 MG per tablet; Take 1 tablet by mouth Daily.  Dispense: 30 tablet; Refill: 11  -     CBC & Differential; Future  -     Comprehensive Metabolic Panel; Future    2. Need for influenza vaccination  -     Fluzone >6mos    3. Type 2 diabetes mellitus with hyperglycemia, without long-term current use of  insulin  Comments:  check labs. hope for help with jardiance. cont metofrmin.  Orders:  -     metFORMIN (GLUCOPHAGE) 500 MG tablet; Take 1 tablet by mouth 2 (Two) Times a Day With Meals.  Dispense: 180 tablet; Refill: 3  -     Lipid Panel; Future  -     Hemoglobin A1c; Future  -     Microalbumin / Creatinine Urine Ratio - Urine, Clean Catch; Future  -     empagliflozin (Jardiance) 25 MG tablet tablet; Take 1 tablet by mouth Daily.  Dispense: 90 tablet; Refill: 3    Other orders  -     Discontinue: spironolactone-hydrochlorothiazide (ALDACTAZIDE) 25-25 MG tablet; Take 1 tablet by mouth Daily.  Dispense: 90 tablet; Refill: 1  -     vitamin D (ERGOCALCIFEROL) 1.25 MG (14520 UT) capsule capsule; Take 1 capsule by mouth Every 7 (Seven) Days.  Dispense: 13 capsule; Refill: 3  -     vitamin B-12 (CYANOCOBALAMIN) 1000 MCG tablet; Take 1 tablet by mouth Daily.  Dispense: 90 tablet; Refill: 3        Medications Discontinued During This Encounter   Medication Reason    vitamin B-12 (CYANOCOBALAMIN) 1000 MCG tablet Reorder    losartan (COZAAR) 100 MG tablet Reorder    vitamin D (ERGOCALCIFEROL) 1.25 MG (32097 UT) capsule capsule Reorder    metFORMIN (GLUCOPHAGE) 500 MG tablet Reorder    spironolactone-hydrochlorothiazide (ALDACTAZIDE) 25-25 MG tablet Reorder    spironolactone-hydrochlorothiazide (ALDACTAZIDE) 25-25 MG tablet Alternate therapy          Follow Up   Return if symptoms worsen or fail to improve.  Patient was given instructions and counseling regarding his condition or for health maintenance advice. Please see specific information pulled into the AVS if appropriate.       Blu Engel Jr, MD  11/11/24  10:12 EST

## 2025-01-09 ENCOUNTER — PREP FOR SURGERY (OUTPATIENT)
Dept: OTHER | Facility: HOSPITAL | Age: 65
End: 2025-01-09
Payer: COMMERCIAL

## 2025-01-09 ENCOUNTER — OFFICE VISIT (OUTPATIENT)
Dept: ORTHOPEDIC SURGERY | Facility: CLINIC | Age: 65
End: 2025-01-09
Payer: COMMERCIAL

## 2025-01-09 VITALS
DIASTOLIC BLOOD PRESSURE: 71 MMHG | BODY MASS INDEX: 35.16 KG/M2 | OXYGEN SATURATION: 97 % | SYSTOLIC BLOOD PRESSURE: 106 MMHG | HEART RATE: 98 BPM | WEIGHT: 224 LBS | HEIGHT: 67 IN

## 2025-01-09 DIAGNOSIS — M25.562 LEFT KNEE PAIN, UNSPECIFIED CHRONICITY: ICD-10-CM

## 2025-01-09 DIAGNOSIS — M19.90 ARTHRITIS: ICD-10-CM

## 2025-01-09 DIAGNOSIS — M25.561 RIGHT KNEE PAIN, UNSPECIFIED CHRONICITY: Primary | ICD-10-CM

## 2025-01-09 DIAGNOSIS — M17.11 ARTHRITIS OF KNEE, RIGHT: ICD-10-CM

## 2025-01-09 DIAGNOSIS — M17.12 ARTHRITIS OF KNEE, LEFT: ICD-10-CM

## 2025-01-09 DIAGNOSIS — M17.12 ARTHRITIS OF KNEE, LEFT: Primary | ICD-10-CM

## 2025-01-09 DIAGNOSIS — M17.0 BILATERAL PRIMARY OSTEOARTHRITIS OF KNEE: Primary | ICD-10-CM

## 2025-01-09 PROCEDURE — 99214 OFFICE O/P EST MOD 30 MIN: CPT | Performed by: PHYSICIAN ASSISTANT

## 2025-01-09 RX ORDER — TRANEXAMIC ACID 10 MG/ML
1000 INJECTION, SOLUTION INTRAVENOUS ONCE
OUTPATIENT
Start: 2025-01-09 | End: 2025-01-09

## 2025-01-09 NOTE — PROGRESS NOTES
"Chief Complaint  Follow-up of the Left Knee    Subjective          History of Present Illness      Kit Gama is a 64 y.o. male  presents to [facility] for     Patient presents for follow-up evaluation of bilateral knee pain and bilateral knee osteoarthritis.  He has not been seen since April 2024.  He saw Dr. Singer initially for his bilateral knees.  He had steroid injections back in April which he states only lasted a few weeks.  He states the left is worse than the right today.  He admits to pain with walking pain with standing pain with going up and down stairs.  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  He states his productivity is affected by his left knee.  He is wanting to discuss further treatment options.      No Known Allergies     Social History     Socioeconomic History    Marital status:    Tobacco Use    Smoking status: Never    Smokeless tobacco: Never   Vaping Use    Vaping status: Never Used   Substance and Sexual Activity    Alcohol use: Never    Drug use: Never    Sexual activity: Defer        REVIEW OF SYSTEMS    Constitutional: Awake alert and oriented x3, no acute distress, denies fevers, chills, weight loss  Respiratory: No respiratory distress  Vascular: Brisk cap refill, Intact distal pulses, No cyanosis, compartments soft with no signs or symptoms of compartment syndrome or DVT.   Cardiovascular: Denies chest pain, shortness of breath  Skin: Denies rashes, acute skin changes  Neurologic: Denies headache, loss of consciousness  MSK: Left knee pain, right knee pain      Objective   Vital Signs:   /71   Pulse 98   Ht 170.2 cm (67.01\")   Wt 102 kg (224 lb)   SpO2 97%   BMI 35.07 kg/m²     Body mass index is 35.07 kg/m².    Physical Exam       Right knee- ROM -5 to 120 degrees. Stable to varus/valgus " stress. Stable to anterior/posterior drawer. Positive EHL, FHL, GS and TA. Sensation intact to all 5 nerves of the foot. Positive pulses. Non-tender. No effusion. Positive crepitus. Negative Eudardo's. Negative Lachman's.      Left knee- ROM -5 to 120 degrees. Stable to varus/valgus stress. Stable to anterior/posterior drawer. Positive EHL, FHL, GS and TA. Sensation intact to all 5 nerves of the foot. Positive pulses. Non-tender. No effusion. Positive crepitus. Negative Eduardo's. Negative Lachman's.      Procedures    Imaging Results (Most Recent)       Procedure Component Value Units Date/Time    XR Knee 3 View Left [[id number]] Resulted: 01/10/25 1036     Updated: 01/10/25 1036    Narrative:      View:AP/Lateral and Sunrise view(s)  Site: Left knee  Indication: Left knee pain  Study: X-rays ordered, taken in the office, and reviewed today  X-ray: No fracture, no dislocation, no acute osseous abnormality, advanced   tricompartmental degenerative changes with near bone-on-bone medial joint   space narrowing and osteophyte formation.  Comparative data: Previous studies             Result Review :   The following data was reviewed by: CARLY Singleton on 01/09/2025:  Data reviewed : Radiologic studies reviewed by me with the patient              Assessment and Plan    Diagnoses and all orders for this visit:    1. Right knee pain, unspecified chronicity (Primary)    2. Arthritis of knee, right    3. Arthritis of knee, left    4. Left knee pain, unspecified chronicity  -     XR Knee 3 View Left        Reviewed x-rays with the patient discussed diagnosis and treatment options with him he was advised of risk benefits procedure recovery of left total knee arthroplasty with Lisbet he agreed to have surgery scheduled follow-up 2 weeks postop    Discussed surgery., Risks/benefits discussed with patient including, but not limited to: infection, bleeding, neurovascular damage, re-rupture, aesthetic deformity, need  for further surgery, and death., Discussed with patient the implant type being used during surgery and patient understands., Surgery pamphlet given., Call or return if worsening symptoms., DME order for a 3 in 1 given today due to patient will be confined to one room/level of the home that does not offer a toilet during postop recovery. , I am prescribing  the Teja® Pro2.0 sustained acoustic medicine device for the acceleration of soft tissue repair and reduction of pain. , I am ordering the use of the Nice1 cold therapy machine for 60 days post-op as part of an opioid-sparing approach to help manage pain and edema.  I feel this is medically necessary for the best care for this patient.   , and IFC can help extend pain relief and hopefully reduce need for pain medication. TENS is used to control break through pain. NMES is used to help and strengthen weak muscles and prevent atrophy.    Follow Up   Return for 2 WEEKS POST OP.  Patient was given instructions and counseling regarding his condition or for health maintenance advice. Please see specific information pulled into the AVS if appropriate.       EMR Dragon/Transcription disclaimer:  Part of this note may be an electronic transcription/translation of spoken language to printed text using the Dragon Dictation System

## 2025-01-10 RX ORDER — MELOXICAM 15 MG/1
15 TABLET ORAL DAILY PRN
Qty: 90 TABLET | Refills: 1 | Status: SHIPPED | OUTPATIENT
Start: 2025-01-10

## 2025-01-29 DIAGNOSIS — Z96.652 AFTERCARE FOLLOWING LEFT KNEE JOINT REPLACEMENT SURGERY: Primary | ICD-10-CM

## 2025-01-29 DIAGNOSIS — Z47.1 AFTERCARE FOLLOWING LEFT KNEE JOINT REPLACEMENT SURGERY: Primary | ICD-10-CM

## 2025-02-18 ENCOUNTER — TELEPHONE (OUTPATIENT)
Dept: ORTHOPEDIC SURGERY | Facility: CLINIC | Age: 65
End: 2025-02-18
Payer: COMMERCIAL

## 2025-02-18 NOTE — TELEPHONE ENCOUNTER
PATIENT WOULD LIKE TO CANCEL PAT AND L TKA AT THIS TIME. HE STATES HE IS IN [place] FOLLOWING A DEATH IN THE FAMILY AND IS NOT SURE WHEN HE WILL BE BACK HOME. PATIENT WOULD LIKE TO CALL BACK REGARDING RESCHEDULING. HE WANTS TO TALK TO DR. TYSON AGAIN ABOUT POTENTIALLY DOING BOTH KNEES AT THE SAME TIME. LET HIM KNOW THAT WE WOULD CANCEL PAT AND SURGERY AND TO CALL WHEN HE IS READY TO DISCUSS SURGERY AND WE WILL SCHEDULE AN APPT WITH DR. TYSON. HE VOICED UNDERSTANDING.

## 2025-06-24 NOTE — PROGRESS NOTES
"Chief Complaint  Cough and Nasal Congestion    Subjective          Kit Gama presents to [facility]  History of Present Illness    Reports recovering from recent illness.  Started with lower back pain, then cough and congestion as well as fever.  Last febrile on Wednesday.  His symptoms started this past Monday (5 days ago).  Mr. Gama' wife and granddaughter have recently tested positive for COVID.      Current Outpatient Medications   Medication Instructions    amLODIPine (NORVASC) 10 mg, Oral, Daily    diclofenac (VOLTAREN) 75 MG EC tablet TAKE 1 TABLET BY MOUTH TWICE A DAY AS NEEDED FOR PAIN    hydroCHLOROthiazide (HYDRODIURIL) 25 mg, Oral, Every Morning    losartan (COZAAR) 100 MG tablet TAKE 1 TABLET BY MOUTH EVERY DAY    magnesium oxide (MAG-OX) 400 mg, Oral, Daily    metFORMIN (GLUCOPHAGE) 500 mg, Oral, 2 Times Daily With Meals    vitamin B-12 (CYANOCOBALAMIN) 1,000 mcg, Oral, Daily    vitamin D (ERGOCALCIFEROL) 50,000 Units, Oral, Weekly       The following portions of the patient's history were reviewed and updated as appropriate: allergies, current medications, past family history, past medical history, past social history, past surgical history, and problem list.    Objective   Vital Signs:   /76 (BP Location: Left arm, Patient Position: Sitting, Cuff Size: Adult)   Pulse 74   Temp 98.2 øF (36.8 øC) (Temporal)   Resp 20   Ht 170.2 cm (67\")   Wt 103 kg (228 lb)   SpO2 96%   BMI 35.71 kg/mý     BP Readings from Last 3 Encounters:   09/01/23 114/76   11/17/22 147/83   09/12/22 145/82     Wt Readings from Last 3 Encounters:   09/01/23 103 kg (228 lb)   11/17/22 105 kg (231 lb 6.4 oz)   09/12/22 104 kg (230 lb)         Physical Exam  Vitals reviewed.   Constitutional:       General: He is not in acute distress.     Appearance: Normal appearance. He is not ill-appearing, toxic-appearing or diaphoretic.   HENT:      Head: Normocephalic and " A user error has taken place: encounter opened in error, closed for administrative reasons.     atraumatic.      Right Ear: External ear normal.      Left Ear: External ear normal.      Mouth/Throat:      Pharynx: No oropharyngeal exudate or posterior oropharyngeal erythema.   Eyes:      Conjunctiva/sclera: Conjunctivae normal.   Cardiovascular:      Rate and Rhythm: Normal rate and regular rhythm.      Pulses: Normal pulses.      Heart sounds: Normal heart sounds. No murmur heard.    No friction rub. No gallop.   Pulmonary:      Effort: Pulmonary effort is normal. No respiratory distress.      Breath sounds: Normal breath sounds. No stridor. No wheezing, rhonchi or rales.   Chest:      Chest wall: No tenderness.   Abdominal:      General: Abdomen is flat.      Palpations: Abdomen is soft. There is no mass.      Tenderness: There is no abdominal tenderness.   Musculoskeletal:      Right lower leg: Edema present.      Left lower leg: Edema present.      Comments: Trace LE edema bilaterally   Skin:     General: Skin is warm and dry.   Neurological:      General: No focal deficit present.      Mental Status: He is alert. Mental status is at baseline.   Psychiatric:         Behavior: Behavior normal.         Thought Content: Thought content normal.         Judgment: Judgment normal.          Result Review :   The following data was reviewed by: Paris Brush MA on 09/01/2023:           No results found for: SARSANTIGEN, COVID19, RAPFLUA, RAPFLUB, FLUAAG, FLUABDAG, FLUABDAG, FLU, FLU, FLUBAG, RAPSCRN, STREPAAG, RSV, POCPREGUR, MONOSPOT, INR, LEADCAPBLD, POCLEAD, BILIRUBINUR    Procedures        Assessment and Plan    Diagnoses and all orders for this visit:    1. Acute cough (Primary)    2. Close exposure to COVID-19 virus      -is already recovering from recent COVID infection  -will treat conservatively  -recommended 10 day quarantine, which can be shortened to as little as 5 days if symptoms resolve (however, for the remainder of this period would need to wear a well-fitting mask around others and in  public)    There are no discontinued medications.       Follow Up   No follow-ups on file.  Patient was given instructions and counseling regarding his condition or for health maintenance advice. Please see specific information pulled into the AVS if appropriate.       Paris Brush MA  09/01/23  14:20 EDT

## 2025-07-10 DIAGNOSIS — M19.90 ARTHRITIS: ICD-10-CM

## 2025-07-10 RX ORDER — MELOXICAM 15 MG/1
15 TABLET ORAL DAILY PRN
Qty: 90 TABLET | Refills: 1 | Status: SHIPPED | OUTPATIENT
Start: 2025-07-10